# Patient Record
Sex: FEMALE | Race: WHITE | NOT HISPANIC OR LATINO | Employment: OTHER | URBAN - METROPOLITAN AREA
[De-identification: names, ages, dates, MRNs, and addresses within clinical notes are randomized per-mention and may not be internally consistent; named-entity substitution may affect disease eponyms.]

---

## 2017-02-14 ENCOUNTER — COMMUNICATION - HEALTHEAST (OUTPATIENT)
Dept: FAMILY MEDICINE | Facility: CLINIC | Age: 67
End: 2017-02-14

## 2017-02-14 DIAGNOSIS — I10 UNSPECIFIED ESSENTIAL HYPERTENSION: ICD-10-CM

## 2017-05-19 ENCOUNTER — RECORDS - HEALTHEAST (OUTPATIENT)
Dept: ADMINISTRATIVE | Facility: OTHER | Age: 67
End: 2017-05-19

## 2017-05-23 ENCOUNTER — RECORDS - HEALTHEAST (OUTPATIENT)
Dept: ADMINISTRATIVE | Facility: OTHER | Age: 67
End: 2017-05-23

## 2017-06-09 ENCOUNTER — AMBULATORY - HEALTHEAST (OUTPATIENT)
Dept: FAMILY MEDICINE | Facility: CLINIC | Age: 67
End: 2017-06-09

## 2017-06-09 ENCOUNTER — OFFICE VISIT - HEALTHEAST (OUTPATIENT)
Dept: FAMILY MEDICINE | Facility: CLINIC | Age: 67
End: 2017-06-09

## 2017-06-09 DIAGNOSIS — R73.01 IMPAIRED FASTING GLUCOSE: ICD-10-CM

## 2017-06-09 DIAGNOSIS — K21.9 GERD (GASTROESOPHAGEAL REFLUX DISEASE): ICD-10-CM

## 2017-06-09 DIAGNOSIS — I10 ESSENTIAL (PRIMARY) HYPERTENSION: ICD-10-CM

## 2017-06-09 DIAGNOSIS — I10 ESSENTIAL HYPERTENSION: ICD-10-CM

## 2017-06-09 DIAGNOSIS — I10 UNSPECIFIED ESSENTIAL HYPERTENSION: ICD-10-CM

## 2017-06-09 DIAGNOSIS — E78.5 HYPERLIPIDEMIA, UNSPECIFIED HYPERLIPIDEMIA TYPE: ICD-10-CM

## 2017-06-09 DIAGNOSIS — Z00.00 ROUTINE GENERAL MEDICAL EXAMINATION AT A HEALTH CARE FACILITY: ICD-10-CM

## 2017-06-09 LAB
CHOLEST SERPL-MCNC: 172 MG/DL
FASTING STATUS PATIENT QL REPORTED: YES
HBA1C MFR BLD: 6 % (ref 3.5–6)
HDLC SERPL-MCNC: 41 MG/DL
LDLC SERPL CALC-MCNC: 93 MG/DL
TRIGL SERPL-MCNC: 190 MG/DL

## 2017-06-09 ASSESSMENT — MIFFLIN-ST. JEOR: SCORE: 1312.77

## 2017-06-12 ENCOUNTER — COMMUNICATION - HEALTHEAST (OUTPATIENT)
Dept: FAMILY MEDICINE | Facility: CLINIC | Age: 67
End: 2017-06-12

## 2017-06-12 LAB — HCV AB SERPL QL IA: NEGATIVE

## 2017-08-30 ENCOUNTER — PRE VISIT (OUTPATIENT)
Dept: OTOLARYNGOLOGY | Facility: CLINIC | Age: 67
End: 2017-08-30

## 2017-08-30 NOTE — TELEPHONE ENCOUNTER
1.  Date/reason for appt: 9/22/17 -- tinnitus    2.  Referring provider: self    3.  Call to patient (Yes / No - short description): no, former pt of Dr. Lovett, last seen 7/6/10 -- records in [facility].

## 2017-09-11 ENCOUNTER — COMMUNICATION - HEALTHEAST (OUTPATIENT)
Dept: FAMILY MEDICINE | Facility: CLINIC | Age: 67
End: 2017-09-11

## 2017-09-14 ENCOUNTER — HOSPITAL ENCOUNTER (OUTPATIENT)
Dept: MAMMOGRAPHY | Facility: HOSPITAL | Age: 67
Discharge: HOME | End: 2017-09-14
Attending: FAMILY MEDICINE

## 2017-09-14 DIAGNOSIS — Z12.31 VISIT FOR SCREENING MAMMOGRAM: ICD-10-CM

## 2017-09-28 DIAGNOSIS — H93.19 TINNITUS: Primary | ICD-10-CM

## 2017-10-03 ENCOUNTER — RECORDS - HEALTHEAST (OUTPATIENT)
Dept: ADMINISTRATIVE | Facility: OTHER | Age: 67
End: 2017-10-03

## 2017-10-06 ENCOUNTER — OFFICE VISIT (OUTPATIENT)
Dept: OTOLARYNGOLOGY | Facility: CLINIC | Age: 67
End: 2017-10-06

## 2017-10-06 ENCOUNTER — OFFICE VISIT (OUTPATIENT)
Dept: AUDIOLOGY | Facility: CLINIC | Age: 67
End: 2017-10-06

## 2017-10-06 VITALS — BODY MASS INDEX: 32.07 KG/M2 | HEIGHT: 63 IN | WEIGHT: 181 LBS

## 2017-10-06 DIAGNOSIS — H93.19 TINNITUS, UNSPECIFIED LATERALITY: Primary | ICD-10-CM

## 2017-10-06 DIAGNOSIS — H90.3 SENSORY HEARING LOSS, BILATERAL: Primary | ICD-10-CM

## 2017-10-06 RX ORDER — FLUOCINONIDE TOPICAL SOLUTION USP, 0.05% 0.5 MG/ML
SOLUTION TOPICAL
COMMUNITY
Start: 2017-05-23 | End: 2023-06-19

## 2017-10-06 RX ORDER — LISINOPRIL/HYDROCHLOROTHIAZIDE 10-12.5 MG
TABLET ORAL
COMMUNITY
Start: 2017-06-09 | End: 2022-06-21 | Stop reason: DRUGHIGH

## 2017-10-06 RX ORDER — ASPIRIN 81 MG/1
81 TABLET, CHEWABLE ORAL DAILY
COMMUNITY

## 2017-10-06 RX ORDER — ATORVASTATIN CALCIUM 40 MG/1
40 TABLET, FILM COATED ORAL AT BEDTIME
COMMUNITY

## 2017-10-06 ASSESSMENT — PAIN SCALES - GENERAL: PAINLEVEL: NO PAIN (0)

## 2017-10-06 NOTE — PATIENT INSTRUCTIONS
You will need  to schedule a follow up appointment in one year with an audio.   Please call our clinic for any questions,concerns,or worsening symptoms.      Clinic #[phone]       Option 3  for the triage nurse.

## 2017-10-06 NOTE — LETTER
10/6/2017       RE: Elzbieta Mendez  [address]  [facility] [address]     Dear Colleague,    Thank you for referring your patient, Elzbieta Mendez, to the [place] [facility] at [facility]. Please see a copy of my visit note below.    HPI: Elzbieta Mendez is a relatively healthy 66yoF with a history of controlled HTN being seen with a complaint of recent R ear fullness and increased pulsatile tinnitus. She was first symptomatic for pulsatile tinnitus of the R ear in 2010 when she received a CT which was normal. There was no associated ear pain, fullness, or vertigo at the time. For the past couple of months, she reports an increase in intensity of her pulsatile tinnitus with fullness and no other symptoms. Following removal of cerumen from her R ear by the audiologist, she stated her symptoms of increased tinnitus and fullness immediately resolved back to her baseline.      PMH:   HTN     FAMHx:  Her family does not have a history of otosclerosis.  The unilateral pulsatile tinnitus was seen.        MEDS: Lipitor, lisinopril.       ROS:  Her 12 point review of systems is negative except for what is stated in HPI.       PE: TMs anatomically intact and normal without evidence of retraction or effusion. Charles is midline. Neck is supple and no masses palpated.    AUDIOGRAM/TYMPANOMETRY: Borderline normal to mild SNHL bilaterally up to 4000 Hz sloping downward to moderate bilaterally up to 8000 Hz. Word rec 92 and 96 percent for L and R ear respectively. Normal tymps.     IMPRESSION:  Pulsatile tinnitus of the R ear with an unidentified etiology. Recent increased intensity and fullness likely a result of cerumen impaction as symptoms resolved following removal. Possible etiologies of pulsatile tinnitus include a persistent congenital stapedial artery or other microvascular compression of nerves or occicular chain.  Varied course of ICA, an exposed jugular bulb,  AVM/fistula or a vascularized tumor such as a paraganglioma are less likely given the normal CT in 2010.  Discussed options at this point and the patient did not wish to do anything further.     I, Jake Lovett MD, saw this patient with the student and agree with the findings and plan of care as documented in the resident s note. I personally reviewed the medications, labs and imaging.      Again, thank you for allowing me to participate in the care of your patient.      Sincerely,    Jake Lovett MD

## 2017-10-06 NOTE — MR AVS SNAPSHOT
After Visit Summary   10/6/2017    Elzbieta Mendez    MRN: [id number]           Patient Information     Date Of Birth          1950        Visit Information        Provider Department      10/6/2017 9:30 AM Jake Lovett MD [facility]        Today's Diagnoses     Tinnitus, unspecified laterality    -  1      Care Instructions    You will need  to schedule a follow up appointment in one year with an audio.   Please call our clinic for any questions,concerns,or worsening symptoms.      Clinic #[phone]       Option 3  for the triage nurse.          Follow-ups after your visit        Who to contact     Please call your clinic at [phone] to:    Ask questions about your health    Make or cancel appointments    Discuss your medicines    Learn about your test results    Speak to your doctor   If you have compliments or concerns about an experience at your clinic, or if you wish to file a complaint, please contact [facility] Patient Relations at [phone] or email us at [email or web address]         Additional Information About Your Visit        MyChart Information     Monthlys is an electronic gateway that provides easy, online access to your medical records. With Monthlys, you can request a clinic appointment, read your test results, renew a prescription or communicate with your care team.     To sign up for Monthlys visit the website at [email or web address]   You will be asked to enter the access code listed below, as well as some personal information. Please follow the directions to create your username and password.     Your access code is: [email or web address]  Expires: 2017  6:30 AM     Your access code will  in 90 days. If you need help or a new code, please contact your [facility] or call [phone] for assistance.        Care EveryWhere ID     This is your Care EveryWhere ID. This  "could be used by other organizations to access your [place] medical records  [id number]        Your Vitals Were     Height BMI (Body Mass Index)                1.59 m (5' 2.6\") 32.48 kg/m2           Blood Pressure from Last 3 Encounters:   No data found for BP    Weight from Last 3 Encounters:   10/06/17 82.1 kg (181 lb)              Today, you had the following     No orders found for display       Primary Care Provider    None Specified       No primary provider on file.        Equal Access to Services     [facility] HospitalDOMING[facility] : Hadii aad ku hadasho Soomaali, waaxda luqadaha, qaybta kaalmada adeegyada, waxana cameliain barbaran abdirahman winnieshantell laselam . So [facility] [phone].    ATENCIÓN: Si habla español, tiene a diaz disposición servicios gratuitos de asistencia lingüística. [facility]Ohio[facility] [phone].    We comply with applicable federal civil rights laws and [place] laws. We do not discriminate on the basis of race, color, national origin, age, disability, sex, sexual orientation, or gender identity.            Thank you!     Thank you for choosing [facility] EAR NOSE AND THROAT  for your care. Our goal is always to provide you with excellent care. Hearing back from our patients is one way we can continue to improve our services. Please take a few minutes to complete the written survey that you may receive in the mail after your visit with us. Thank you!             Your Updated Medication List - Protect others around you: Learn how to safely use, store and throw away your medicines at www.disposemymeds.org.          This list is accurate as of: 10/6/17 11:59 PM.  Always use your most recent med list.                   Brand Name Dispense Instructions for use Diagnosis    aspirin 81 MG chewable tablet      81 mg        atorvastatin 40 MG tablet    LIPITOR     Take 40 mg by mouth        fluocinonide 0.05 % solution    LIDEX     KWADWO EXT TO THE SCALP 3 NIGHTS PER WEEK HS        lisinopril-hydrochlorothiazide 10-12.5 MG per tablet    " PRINZIDE/ZESTORETIC     TAKE 1 TABLET BY MOUTH EVERY DAY        omeprazole 20 MG CR capsule    priLOSEC     TAKE ONE CAPSULE BY MOUTH EVERY DAY

## 2017-10-06 NOTE — MR AVS SNAPSHOT
After Visit Summary   10/6/2017    Elzbieta Mendez    MRN: [id number]           Patient Information     Date Of Birth          1950        Visit Information        Provider Department      10/6/2017 9:00 AM Osiris Cheney AuD M [facility] Audiology        Today's Diagnoses     Sensory hearing loss, bilateral    -  1       Follow-ups after your visit        Who to contact     Please call your clinic at [phone] to:    Ask questions about your health    Make or cancel appointments    Discuss your medicines    Learn about your test results    Speak to your doctor   If you have compliments or concerns about an experience at your clinic, or if you wish to file a complaint, please contact [facility] Patient Relations at [phone] or email us at [email or web address]         Additional Information About Your Visit        MyChart Information     Amadix is an electronic gateway that provides easy, online access to your medical records. With Amadix, you can request a clinic appointment, read your test results, renew a prescription or communicate with your care team.     To sign up for Emidat visit the website at [email or web address]   You will be asked to enter the access code listed below, as well as some personal information. Please follow the directions to create your username and password.     Your access code is: [email or web address]  Expires: 2017  6:30 AM     Your access code will  in 90 days. If you need help or a new code, please contact your [facility] or call [phone] for assistance.        Care EveryWhere ID     This is your Care EveryWhere ID. This could be used by other organizations to access your [place] medical records  [id number]         Blood Pressure from Last 3 Encounters:   No data found for BP    Weight from Last 3 Encounters:   10/06/17 82.1 kg (181 lb)              We Performed the  Following     AUDIOGRAM/TYMPANOGRAM - INTERFACE     AUDIOLOGY ADULT REFERRAL     Cerumen Removal (separate procedure)   (26285)     Cmpr[facility] Audiometry Thrshld Eval & Speech Recog (94003)     Tymps / Reflex   (63590)        Primary Care Provider    None Specified       No primary provider on file.        Equal Access to Services     ANT MOLINA : Hadii zack ku hadasho Soomaali, waaxda luqadaha, qaybta kaalmada adeegyada, waxay rajesh hayjunitoortiz simmonssavanahshantell long. So [facility] [phone].    ATENCIÓN: Si habla español, tiene a diaz disposición servicios gratuitos de asistencia lingüística. Llame al [phone].    We comply with applicable federal civil rights laws and [place] laws. We do not discriminate on the basis of race, color, national origin, age, disability, sex, sexual orientation, or gender identity.            Thank you!     Thank you for choosing [facility] AUDIOLOGY  for your care. Our goal is always to provide you with excellent care. Hearing back from our patients is one way we can continue to improve our services. Please take a few minutes to complete the written survey that you may receive in the mail after your visit with us. Thank you!             Your Updated Medication List - Protect others around you: Learn how to safely use, store and throw away your medicines at www.disposemymeds.org.          This list is accurate as of: 10/6/17 10:13 AM.  Always use your most recent med list.                   Brand Name Dispense Instructions for use Diagnosis    aspirin 81 MG chewable tablet      81 mg        atorvastatin 40 MG tablet    LIPITOR     Take 40 mg by mouth        fluocinonide 0.05 % solution    LIDEX     KWADWO EXT TO THE SCALP 3 NIGHTS PER WEEK HS        lisinopril-hydrochlorothiazide 10-12.5 MG per tablet    PRINZIDE/ZESTORETIC     TAKE 1 TABLET BY MOUTH EVERY DAY        omeprazole 20 MG CR capsule    priLOSEC     TAKE ONE CAPSULE BY MOUTH EVERY DAY

## 2017-10-06 NOTE — LETTER
Date:October 10, 2017      Patient was self referred, no letter generated. Do not send.        [facility] Health Information

## 2017-10-06 NOTE — LETTER
10/6/2017      RE: Elzbieta Mendez  [address]  [place] [address]       HPI: Elzbieta Mendez is a relatively healthy 66yoF with a history of controlled HTN being seen with a complaint of recent R ear fullness and increased pulsatile tinnitus. She was first symptomatic for pulsatile tinnitus of the R ear in 2010 when she received a CT which was normal. There was no associated ear pain, fullness, or vertigo at the time. For the past couple of months, she reports an increase in intensity of her pulsatile tinnitus with fullness and no other symptoms. Following removal of cerumen from her R ear by the audiologist, she stated her symptoms of increased tinnitus and fullness immediately resolved back to her baseline.      PMH:   HTN     FAMHx:  Her family does not have a history of otosclerosis.  The unilateral pulsatile tinnitus was seen.        MEDS: Lipitor, lisinopril.       ROS:  Her 12 point review of systems is negative except for what is stated in HPI.       PE: TMs anatomically intact and normal without evidence of retraction or effusion. Charles is midline. Neck is supple and no masses palpated.    AUDIOGRAM/TYMPANOMETRY: Borderline normal to mild SNHL bilaterally up to 4000 Hz sloping downward to moderate bilaterally up to 8000 Hz. Word rec 92 and 96 percent for L and R ear respectively. Normal tymps.     IMPRESSION:  Pulsatile tinnitus of the R ear with an unidentified etiology. Recent increased intensity and fullness likely a result of cerumen impaction as symptoms resolved following removal. Possible etiologies of pulsatile tinnitus include a persistent congenital stapedial artery or other microvascular compression of nerves or occicular chain.  Varied course of ICA, an exposed jugular bulb, AVM/fistula or a vascularized tumor such as a paraganglioma are less likely given the normal CT in 2010.  Discussed options at this point and the patient did not wish to do anything further.     Jake DE JESUS  MD Rachell, saw this patient with the student and agree with the findings and plan of care as documented in the resident s note. I personally reviewed the medications, labs and imaging.      Jake Lovett MD

## 2017-10-06 NOTE — PROGRESS NOTES
HPI: Elzbieta Mendez is a relatively healthy 66yoF with a history of controlled HTN being seen with a complaint of recent R ear fullness and increased pulsatile tinnitus. She was first symptomatic for pulsatile tinnitus of the R ear in 2010 when she received a CT which was normal. There was no associated ear pain, fullness, or vertigo at the time. For the past couple of months, she reports an increase in intensity of her pulsatile tinnitus with fullness and no other symptoms. Following removal of cerumen from her R ear by the audiologist, she stated her symptoms of increased tinnitus and fullness immediately resolved back to her baseline.      PMH:  HTN     FAMHx:  Her family does not have a history of otosclerosis.  The unilateral pulsatile tinnitus was seen.        MEDS: Lipitor, lisinopril.       ROS:  Her 12 point review of systems is negative except for what is stated in HPI.       PE: TMs anatomically intact and normal without evidence of retraction or effusion. Charles is midline. Neck is supple and no masses palpated.    AUDIOGRAM/TYMPANOMETRY: Borderline normal to mild SNHL bilaterally up to 4000 Hz sloping downward to moderate bilaterally up to 8000 Hz. Word rec 92 and 96 percent for L and R ear respectively. Normal tymps.     IMPRESSION:  Pulsatile tinnitus of the R ear with an unidentified etiology. Recent increased intensity and fullness likely a result of cerumen impaction as symptoms resolved following removal. Possible etiologies of pulsatile tinnitus include a persistent congenital stapedial artery or other microvascular compression of nerves or occicular chain.  Varied course of ICA, an exposed jugular bulb, AVM/fistula or a vascularized tumor such as a paraganglioma are less likely given the normal CT in 2010.  Discussed options at this point and the patient did not wish to do anything further.     IJake MD, saw this patient with the student and agree with the findings and plan of  care as documented in the resident s note. I personally reviewed the medications, labs and imaging.

## 2017-10-06 NOTE — PROGRESS NOTES
AUDIOLOGY REPORT    SUMMARY: Audiology visit completed. See audiogram for results.      RECOMMENDATIONS: Follow-up with ENT.    Gabriel Bullock.  Licensed Audiologist  [place] #[id number]

## 2017-11-26 ENCOUNTER — HEALTH MAINTENANCE LETTER (OUTPATIENT)
Age: 67
End: 2017-11-26

## 2018-08-31 ENCOUNTER — HOSPITAL ENCOUNTER (OUTPATIENT)
Dept: MAMMOGRAPHY | Facility: CLINIC | Age: 68
Discharge: HOME | End: 2018-08-31
Attending: FAMILY MEDICINE

## 2018-08-31 DIAGNOSIS — Z12.31 VISIT FOR SCREENING MAMMOGRAM: ICD-10-CM

## 2019-06-17 ENCOUNTER — COMMUNICATION - HEALTHEAST (OUTPATIENT)
Dept: FAMILY MEDICINE | Facility: CLINIC | Age: 69
End: 2019-06-17

## 2019-06-17 ENCOUNTER — OFFICE VISIT - HEALTHEAST (OUTPATIENT)
Dept: FAMILY MEDICINE | Facility: CLINIC | Age: 69
End: 2019-06-17

## 2019-06-17 ENCOUNTER — AMBULATORY - HEALTHEAST (OUTPATIENT)
Dept: FAMILY MEDICINE | Facility: CLINIC | Age: 69
End: 2019-06-17

## 2019-06-17 DIAGNOSIS — I10 ESSENTIAL HYPERTENSION: ICD-10-CM

## 2019-06-17 DIAGNOSIS — K21.9 GERD (GASTROESOPHAGEAL REFLUX DISEASE): ICD-10-CM

## 2019-06-17 DIAGNOSIS — E78.5 HYPERLIPIDEMIA LDL GOAL <130: ICD-10-CM

## 2019-06-17 DIAGNOSIS — R73.03 PREDIABETES: ICD-10-CM

## 2019-06-17 LAB
ALBUMIN SERPL-MCNC: 4.7 G/DL (ref 3.5–5)
ALP SERPL-CCNC: 50 U/L (ref 45–120)
ALT SERPL W P-5'-P-CCNC: 180 U/L (ref 0–45)
ANION GAP SERPL CALCULATED.3IONS-SCNC: 10 MMOL/L (ref 5–18)
AST SERPL W P-5'-P-CCNC: 89 U/L (ref 0–40)
BILIRUB SERPL-MCNC: 0.7 MG/DL (ref 0–1)
BUN SERPL-MCNC: 17 MG/DL (ref 8–22)
CALCIUM SERPL-MCNC: 10.7 MG/DL (ref 8.5–10.5)
CHLORIDE BLD-SCNC: 102 MMOL/L (ref 98–107)
CHOLEST SERPL-MCNC: 163 MG/DL
CO2 SERPL-SCNC: 27 MMOL/L (ref 22–31)
CREAT SERPL-MCNC: 0.87 MG/DL (ref 0.6–1.1)
FASTING STATUS PATIENT QL REPORTED: YES
GFR SERPL CREATININE-BSD FRML MDRD: >60 ML/MIN/1.73M2
GLUCOSE BLD-MCNC: 104 MG/DL (ref 70–125)
HBA1C MFR BLD: 6.2 % (ref 3.5–6)
HDLC SERPL-MCNC: 44 MG/DL
LDLC SERPL CALC-MCNC: 91 MG/DL
POTASSIUM BLD-SCNC: 4.3 MMOL/L (ref 3.5–5)
PROT SERPL-MCNC: 7.4 G/DL (ref 6–8)
SODIUM SERPL-SCNC: 139 MMOL/L (ref 136–145)
TRIGL SERPL-MCNC: 140 MG/DL

## 2019-07-12 ENCOUNTER — COMMUNICATION - HEALTHEAST (OUTPATIENT)
Dept: FAMILY MEDICINE | Facility: CLINIC | Age: 69
End: 2019-07-12

## 2019-07-12 DIAGNOSIS — R74.8 ELEVATED LIVER ENZYMES: ICD-10-CM

## 2019-07-16 ENCOUNTER — COMMUNICATION - HEALTHEAST (OUTPATIENT)
Dept: LAB | Facility: CLINIC | Age: 69
End: 2019-07-16

## 2019-07-16 ENCOUNTER — AMBULATORY - HEALTHEAST (OUTPATIENT)
Dept: FAMILY MEDICINE | Facility: CLINIC | Age: 69
End: 2019-07-16

## 2019-07-16 DIAGNOSIS — R79.89 ELEVATED LFTS: ICD-10-CM

## 2019-07-22 ENCOUNTER — AMBULATORY - HEALTHEAST (OUTPATIENT)
Dept: LAB | Facility: CLINIC | Age: 69
End: 2019-07-22

## 2019-07-22 DIAGNOSIS — R79.89 ELEVATED LFTS: ICD-10-CM

## 2019-07-22 LAB
ALBUMIN SERPL-MCNC: 4.7 G/DL (ref 3.5–5)
ALP SERPL-CCNC: 79 U/L (ref 45–120)
ALT SERPL W P-5'-P-CCNC: 98 U/L (ref 0–45)
ANION GAP SERPL CALCULATED.3IONS-SCNC: 13 MMOL/L (ref 5–18)
AST SERPL W P-5'-P-CCNC: 52 U/L (ref 0–40)
BILIRUB SERPL-MCNC: 0.7 MG/DL (ref 0–1)
BUN SERPL-MCNC: 8 MG/DL (ref 8–22)
CALCIUM SERPL-MCNC: 10.5 MG/DL (ref 8.5–10.5)
CHLORIDE BLD-SCNC: 102 MMOL/L (ref 98–107)
CO2 SERPL-SCNC: 24 MMOL/L (ref 22–31)
CREAT SERPL-MCNC: 0.8 MG/DL (ref 0.6–1.1)
GFR SERPL CREATININE-BSD FRML MDRD: >60 ML/MIN/1.73M2
GLUCOSE BLD-MCNC: 93 MG/DL (ref 70–125)
POTASSIUM BLD-SCNC: 4 MMOL/L (ref 3.5–5)
PROT SERPL-MCNC: 7.4 G/DL (ref 6–8)
SODIUM SERPL-SCNC: 139 MMOL/L (ref 136–145)

## 2019-07-23 ENCOUNTER — OFFICE VISIT - HEALTHEAST (OUTPATIENT)
Dept: FAMILY MEDICINE | Facility: CLINIC | Age: 69
End: 2019-07-23

## 2019-07-23 DIAGNOSIS — R79.89 ELEVATED LFTS: ICD-10-CM

## 2019-07-23 DIAGNOSIS — K75.81 STEATOHEPATITIS: ICD-10-CM

## 2019-07-23 DIAGNOSIS — R74.8 ELEVATED LIVER ENZYMES: ICD-10-CM

## 2019-07-23 DIAGNOSIS — E78.5 HYPERLIPIDEMIA LDL GOAL <130: ICD-10-CM

## 2019-07-29 ENCOUNTER — COMMUNICATION - HEALTHEAST (OUTPATIENT)
Dept: FAMILY MEDICINE | Facility: CLINIC | Age: 69
End: 2019-07-29

## 2019-08-22 ENCOUNTER — COMMUNICATION - HEALTHEAST (OUTPATIENT)
Dept: FAMILY MEDICINE | Facility: CLINIC | Age: 69
End: 2019-08-22

## 2019-09-03 ENCOUNTER — AMBULATORY - HEALTHEAST (OUTPATIENT)
Dept: LAB | Facility: CLINIC | Age: 69
End: 2019-09-03

## 2019-09-03 ENCOUNTER — HOSPITAL ENCOUNTER (OUTPATIENT)
Dept: MAMMOGRAPHY | Facility: CLINIC | Age: 69
Discharge: HOME | End: 2019-09-03
Attending: FAMILY MEDICINE

## 2019-09-03 DIAGNOSIS — Z12.31 SCREENING MAMMOGRAM, ENCOUNTER FOR: ICD-10-CM

## 2019-09-03 DIAGNOSIS — E78.5 HYPERLIPIDEMIA LDL GOAL <130: ICD-10-CM

## 2019-09-03 DIAGNOSIS — K75.81 STEATOHEPATITIS: ICD-10-CM

## 2019-09-03 DIAGNOSIS — R79.89 ELEVATED LFTS: ICD-10-CM

## 2019-09-03 LAB
ALBUMIN SERPL-MCNC: 4.3 G/DL (ref 3.5–5)
ALP SERPL-CCNC: 78 U/L (ref 45–120)
ALT SERPL W P-5'-P-CCNC: 53 U/L (ref 0–45)
ANION GAP SERPL CALCULATED.3IONS-SCNC: 13 MMOL/L (ref 5–18)
AST SERPL W P-5'-P-CCNC: 40 U/L (ref 0–40)
BILIRUB SERPL-MCNC: 0.9 MG/DL (ref 0–1)
BUN SERPL-MCNC: 13 MG/DL (ref 8–22)
CALCIUM SERPL-MCNC: 10.1 MG/DL (ref 8.5–10.5)
CHLORIDE BLD-SCNC: 101 MMOL/L (ref 98–107)
CHOLEST SERPL-MCNC: 171 MG/DL
CO2 SERPL-SCNC: 24 MMOL/L (ref 22–31)
CREAT SERPL-MCNC: 0.84 MG/DL (ref 0.6–1.1)
FASTING STATUS PATIENT QL REPORTED: YES
GFR SERPL CREATININE-BSD FRML MDRD: >60 ML/MIN/1.73M2
GLUCOSE BLD-MCNC: 110 MG/DL (ref 70–125)
HDLC SERPL-MCNC: 41 MG/DL
LDLC SERPL CALC-MCNC: 84 MG/DL
POTASSIUM BLD-SCNC: 4 MMOL/L (ref 3.5–5)
PROT SERPL-MCNC: 7.1 G/DL (ref 6–8)
SODIUM SERPL-SCNC: 138 MMOL/L (ref 136–145)
TRIGL SERPL-MCNC: 232 MG/DL

## 2019-09-04 ENCOUNTER — COMMUNICATION - HEALTHEAST (OUTPATIENT)
Dept: FAMILY MEDICINE | Facility: CLINIC | Age: 69
End: 2019-09-04

## 2020-03-01 ENCOUNTER — HEALTH MAINTENANCE LETTER (OUTPATIENT)
Age: 70
End: 2020-03-01

## 2020-03-30 ENCOUNTER — COMMUNICATION - HEALTHEAST (OUTPATIENT)
Dept: FAMILY MEDICINE | Facility: CLINIC | Age: 70
End: 2020-03-30

## 2020-09-08 ENCOUNTER — HOSPITAL ENCOUNTER (OUTPATIENT)
Dept: MAMMOGRAPHY | Facility: CLINIC | Age: 70
Discharge: HOME | End: 2020-09-08
Attending: FAMILY MEDICINE

## 2020-09-08 DIAGNOSIS — Z12.31 VISIT FOR SCREENING MAMMOGRAM: ICD-10-CM

## 2020-12-14 ENCOUNTER — HEALTH MAINTENANCE LETTER (OUTPATIENT)
Age: 70
End: 2020-12-14

## 2021-04-17 ENCOUNTER — HEALTH MAINTENANCE LETTER (OUTPATIENT)
Age: 71
End: 2021-04-17

## 2021-05-14 ENCOUNTER — RECORDS - HEALTHEAST (OUTPATIENT)
Dept: FAMILY MEDICINE | Facility: CLINIC | Age: 71
End: 2021-05-14

## 2021-05-14 DIAGNOSIS — Z12.31 VISIT FOR SCREENING MAMMOGRAM: ICD-10-CM

## 2021-05-27 ENCOUNTER — RECORDS - HEALTHEAST (OUTPATIENT)
Dept: ADMINISTRATIVE | Facility: CLINIC | Age: 71
End: 2021-05-27

## 2021-05-28 ENCOUNTER — RECORDS - HEALTHEAST (OUTPATIENT)
Dept: ADMINISTRATIVE | Facility: CLINIC | Age: 71
End: 2021-05-28

## 2021-05-29 ENCOUNTER — RECORDS - HEALTHEAST (OUTPATIENT)
Dept: ADMINISTRATIVE | Facility: CLINIC | Age: 71
End: 2021-05-29

## 2021-05-29 NOTE — PROGRESS NOTES
Patient ID: Elzbieta Mendez is a 68 y.o. female.  /82 Comment: machine  Pulse 74   Wt 185 lb (83.9 kg)   SpO2 98%   BMI 33.57 kg/m      Assessment/Plan:                   Diagnoses and all orders for this visit:    Hyperlipidemia LDL goal <130  -     Lipid Cascade    Essential hypertension  -     Comprehensive Metabolic Panel    GERD (gastroesophageal reflux disease)    Prediabetes  -     Comprehensive Metabolic Panel  -     Glycosylated Hemoglobin A1c    Other orders  -     Cancel: Ambulatory referral for Colonoscopy  -     Cancel: atorvastatin (LIPITOR) 40 MG tablet; Take 1 tablet (40 mg total) by mouth daily.  Dispense: 90 tablet; Refill: 3  -     Cancel: lisinopril-hydrochlorothiazide (PRINZIDE,ZESTORETIC) 10-12.5 mg per tablet; TAKE 1 TABLET BY MOUTH EVERY DAY  Dispense: 90 tablet; Refill: 3  -     Cancel: omeprazole (PRILOSEC) 20 MG capsule; TAKE ONE CAPSULE BY MOUTH EVERY DAY  Dispense: 90 capsule; Refill: 3        DISCUSSION  I recommend at this time stopping fenofibrate as I do not feel it confers additional cardiac risk reduction given her cholesterol numbers.  Recommend she continue with statin, recommend she continue aspirin for primary prevention as well as blood pressure lowering medication.  We will recheck a all labs as noted above.  I will let her know if there are any additional changes.  Subjective:     HPI    Elzbieta Mendez is a 68 y.o. female who is here today to discuss ongoing management of hyperlipidemia, hypertension and prediabetes.  Patient has moved to [place] for the most part.  She does still receive some health care [place] [facility] [place] in the summer months.  Patient came in today for review primarily of cholesterol management.  When she moved to [place] she was placed on fenofibrate in addition to her atorvastatin 40.  Patient states she had elevated triglycerides and this is the reason for this.  Today we discussed cardiac disease risk reduction.   She has had triglycerides reported to be in the range of 100-200.  Oftentimes triglycerides are elevated.  I discussed with her current evidence regarding use of fibroids for management of triglycerides for cardiovascular disease risk duction.  I feel that in her case based on her triglyceride levels that she probably is not receiving significant benefit in terms of further risk reduction being this likely puts her at higher risk for having side effects associated with the statin and fenofibrate combination.  Today we discussed checking lab test.  She continues to feel well overall.  She had moved to [place] to help with care for her grandson who has some significant health issues.    Review of Systems          Objective:   Medications:  Current Outpatient Medications   Medication Sig Note     fenofibrate (TRIGLIDE) 160 MG tablet Take 160 mg by mouth daily.      aspirin 81 MG EC tablet Take 81 mg by mouth daily.      atorvastatin (LIPITOR) 40 MG tablet Take 1 tablet (40 mg total) by mouth daily.      clindamycin (CLEOCIN T) 1 % external solution KWADWO EXT AA BID PRN 6/9/2017: Received from: External Pharmacy     fluocinonide (LIDEX) 0.05 % external solution KWADWO EXT TO THE SCALP 3 NIGHTS PER WEEK HS 6/9/2017: Received from: External Pharmacy     lisinopril-hydrochlorothiazide (PRINZIDE,ZESTORETIC) 10-12.5 mg per tablet TAKE 1 TABLET BY MOUTH EVERY DAY      omeprazole (PRILOSEC) 20 MG capsule TAKE ONE CAPSULE BY MOUTH EVERY DAY      ranitidine (ZANTAC) 150 MG tablet Take 150 mg by mouth.        Allergies:  Allergies   Allergen Reactions     Tetanus Vaccines And Toxoid Swelling       Tobacco:   reports that she has never smoked. She has never used smokeless tobacco.     Physical Exam          /82 Comment: machine  Pulse 74   Wt 185 lb (83.9 kg)   SpO2 98%   BMI 33.57 kg/m        General Appearance:    Alert, cooperative, no distress   Eyes:   No scleral icterus or conjunctival irritation       Lungs:     Clear  to auscultation bilaterally, respirations unlabored, no wheezes or crackles   Heart:    Regular rate and rhythm,  No murmur   Extremities:  No edema, no joint swelling or redness, no evidence of any injuries   Skin:  No concerning skin findings, no suspicious moles, no rashes   Neurologic:  On gross examination there is no motor or sensory deficit.  Patient walks with a normal gait

## 2021-05-30 ENCOUNTER — RECORDS - HEALTHEAST (OUTPATIENT)
Dept: ADMINISTRATIVE | Facility: CLINIC | Age: 71
End: 2021-05-30

## 2021-05-30 NOTE — TELEPHONE ENCOUNTER
Reason contacted:  Imaging question  Information relayed:  Below message   Additional questions:  No  Further follow-up needed:  No  Okay to leave a detailed message:  No

## 2021-05-30 NOTE — TELEPHONE ENCOUNTER
Who is calling: Patient  Reason for Call: Patient is questioning why an ultrasound of the abdomen was ordered. Patient was under the impression she ws going to hold on the imaging at this time. Please reach out to the patient and advise.  Date of last appointment with primary care: 07/23/2019  Okay to leave a detailed message: Yes

## 2021-05-30 NOTE — TELEPHONE ENCOUNTER
CMP pended for approval  Patient notified that she does not need     Per 6/17/2019 Boomrat message:   The AST and ALT are liver tests.  They are elevated compared to previous.  It is uncertain as to exactly why these numbers are up.  This could be from a very mild infection, it could be if you had any alcohol intake recently or it could be a side effect of medications, even the combination of the fenofibrate and atorvastatin as we have discussed.  At this point I do not think we need to be worried about this elevation, but it is something that we should recheck soon.  I think we give it 1 month and have you return for a lab visit.  If numbers remain elevated we may need to consider some additional testing.  If they return to your baseline level obviously there would be no real concern.  We will just need to find a way for you to be able to get some repeat lab tests in about 1 month.     Your A1c number for the blood sugar did creep up just a little bit.  Continue to work hard on the blood sugar.  It is not indicating diabetes.

## 2021-05-30 NOTE — TELEPHONE ENCOUNTER
Who is calling:  Elzbieta   Reason for Call:  Patient, stated Dr. John wanted her to have labs done week of 7/22/19. I did not see any orders for labs. Patient would also like to know if she needs to fast for her labs   Date of last appointment with primary care: 6/17/19  Okay to leave a detailed message: Yes, please call patient to advise her if she needs to fast

## 2021-05-30 NOTE — TELEPHONE ENCOUNTER
Please apologize, I had meant that this would be done down the line only if the abnormality persisted.  I put the order in incorrectly which prompted it to be an active order right away.  Please tell her to disregard for the time being and that we should proceed as we had discussed and obtain the ultrasound only if needed.

## 2021-05-30 NOTE — PROGRESS NOTES
Patient ID: Elzbieta Mendez is a 68 y.o. female.  /76   Pulse 81   SpO2 98%     Assessment/Plan:                   Diagnoses and all orders for this visit:    Elevated LFTs  -     Comprehensive Metabolic Panel; Future; Expected date: 09/02/2019    Steatohepatitis  -     Comprehensive Metabolic Panel; Future; Expected date: 09/02/2019          DISCUSSION  Continue current treatment.  Continue active healthy lifestyle with regular exercise and a healthy balanced diet.  Return for recheck of labs in September.  Consider additional evaluation at that time.  Subjective:     HPI    Elzbieta Mendez is a 68 y.o. female who is here today to discuss recent liver and cholesterol test results.  Patient has a prior history of statin hepatitis seen on imaging tests.  She had slight elevation in liver test recently.  Patient had in the interim since her last evaluation here been placed on fenofibrate by a provider in [place] where she now resides most of the time.  We discussed considerations regarding this combination of medical therapy and the risk of side effect.  Discussed the likely rationale for the combination of medications but patient had preferred to stop this combination and proceed only with the statin.  Upon doing so we noticed a slight improvement in her liver test results, whether this was truly from this medication or not is in question.  We discussed her diagnosis of steatohepatitis and the importance of ongoing monitoring.  Recheck labs and then decide on further course of action.    Review of Systems  Complete review of systems is obtained.  Other than the specific considerations noted above complete review of systems is negative.          Objective:   Medications:  Current Outpatient Medications   Medication Sig Note     aspirin 81 MG EC tablet Take 81 mg by mouth daily.      clindamycin (CLEOCIN T) 1 % external solution KWADWO EXT AA BID PRN 6/9/2017: Received from: External Pharmacy     fluocinonide  (LIDEX) 0.05 % external solution KWADWO EXT TO THE SCALP 3 NIGHTS PER WEEK  6/9/2017: Received from: External Pharmacy     lisinopril-hydrochlorothiazide (PRINZIDE,ZESTORETIC) 10-12.5 mg per tablet TAKE 1 TABLET BY MOUTH EVERY DAY      omeprazole (PRILOSEC) 20 MG capsule TAKE ONE CAPSULE BY MOUTH EVERY DAY      ranitidine (ZANTAC) 150 MG tablet Take 150 mg by mouth.      atorvastatin (LIPITOR) 40 MG tablet Take 1 tablet (40 mg total) by mouth daily.      fenofibrate (TRIGLIDE) 160 MG tablet Take 160 mg by mouth daily.        Allergies:  Allergies   Allergen Reactions     Tetanus Vaccines And Toxoid Swelling       Tobacco:   reports that she has never smoked. She has never used smokeless tobacco.     Physical Exam          /76   Pulse 81   SpO2 98%         General Appearance:    Alert, cooperative, no distress   Eyes:   No scleral icterus or conjunctival irritation       Extremities:  No edema, no joint swelling or redness, no evidence of any injuries   Skin:  No concerning skin findings, no suspicious moles, no rashes   Neurologic:  On gross examination there is no motor or sensory deficit.  Patient walks with a normal gait

## 2021-05-31 ENCOUNTER — RECORDS - HEALTHEAST (OUTPATIENT)
Dept: ADMINISTRATIVE | Facility: CLINIC | Age: 71
End: 2021-05-31

## 2021-05-31 VITALS — BODY MASS INDEX: 33.49 KG/M2 | HEIGHT: 62 IN | WEIGHT: 182 LBS

## 2021-06-02 ENCOUNTER — RECORDS - HEALTHEAST (OUTPATIENT)
Dept: ADMINISTRATIVE | Facility: CLINIC | Age: 71
End: 2021-06-02

## 2021-06-03 VITALS — BODY MASS INDEX: 33.19 KG/M2 | WEIGHT: 185 LBS

## 2021-06-11 NOTE — PROGRESS NOTES
" Patient ID: Elzbieta Mendez is a 66 y.o. female.  /76  Pulse 68  Resp 16  Ht 5' 2.25\" (1.581 m)  Wt 182 lb (82.6 kg)  BMI 33.02 kg/m2    Assessment/Plan:                Diagnoses and all orders for this visit:    Routine general medical examination at a health care facility  -     Hepatitis C Antibody (Anti-HCV)    Essential (primary) hypertension  -     Comprehensive Metabolic Panel    Hyperlipidemia, unspecified hyperlipidemia type  -     Comprehensive Metabolic Panel  -     Lipid Heath FASTING    Impaired fasting glucose  -     Glycosylated Hemoglobin A1c    Hypertension  -     lisinopril-hydrochlorothiazide (PRINZIDE,ZESTORETIC) 10-12.5 mg per tablet; TAKE 1 TABLET BY MOUTH EVERY DAY  Dispense: 90 tablet; Refill: 3    GERD (gastroesophageal reflux disease)  -     omeprazole (PRILOSEC) 20 MG capsule; TAKE ONE CAPSULE BY MOUTH EVERY DAY  Dispense: 90 capsule; Refill: 3    Essential hypertension    Other orders  -     atorvastatin (LIPITOR) 40 MG tablet; Take 1 tablet (40 mg total) by mouth daily.  Dispense: 90 tablet; Refill: 3      DISCUSSION  Obtain labs as above.  Continue current medication treatment.  Subjective:     HPI    Elzbieta Mendez is a 66-year-old female who is here today for a physical.  She has coronary atherosclerosis found with routine coronary calcium CT score testing.  She does not have any symptoms to suggest any concerns she is on appropriate medical management.  Reviewed other routine health screening including colonoscopy due in 2019.  Reviewed recent mammography report.  Discussed that she is not indicated to have cervical cancer screening because she has had a hysterectomy for benign reasons in the past.  Patient states she had seen an ENT provider, orthopedic specialist and a gynecologist in the past year.  She reports no current gynecologic issues.  She continues to see the hand specialist because of ongoing thumb pain.  She is also seen orthopedic specialist for knee " pain in the past.  Reports some increasing knee pain more recently.  She is seen by dermatology and has had several precancerous lesions treated.  She does have planned follow-up there.  She is also been seen by ENT when she had an acute infection in the larynx.  She no longer reports any symptoms in that regard.  She does have prediabetes with an A1c of 5.8 on last year.  Discussed laboratory reevaluation for all underlying concerns.  Reports no other symptomatic concerns.    Review of Systems  Complete review of systems is obtained.  Other than the specific considerations noted above complete review of systems is negative.      Objective:   Medications:  Current Outpatient Prescriptions   Medication Sig Note     aspirin 81 MG EC tablet Take 81 mg by mouth daily.      atorvastatin (LIPITOR) 40 MG tablet Take 1 tablet (40 mg total) by mouth daily.      clindamycin (CLEOCIN T) 1 % external solution KWADWO EXT AA BID PRN 6/9/2017: Received from: External Pharmacy     fluocinonide (LIDEX) 0.05 % external solution KWADWO EXT TO THE SCALP 3 NIGHTS PER WEEK HS 6/9/2017: Received from: External Pharmacy     lisinopril-hydrochlorothiazide (PRINZIDE,ZESTORETIC) 10-12.5 mg per tablet TAKE 1 TABLET BY MOUTH EVERY DAY      omeprazole (PRILOSEC) 20 MG capsule TAKE ONE CAPSULE BY MOUTH EVERY DAY      Allergies:  Allergies   Allergen Reactions     Tetanus Vaccines And Toxoid Swelling     Tobacco:   reports that she has never smoked. She does not have any smokeless tobacco history on file.    HEALTH PREVENTION    General  Dental care: Discussed the importance of regular dental care.  Eye care: Discussed importance of routine eye exams for glaucoma screening  Exercise: Discussed maintaining physical activity  Diet: Discussed healthy balanced diet    Wt Readings from Last 3 Encounters:   06/09/17 182 lb (82.6 kg)   09/12/16 184 lb (83.5 kg)   08/26/16 180 lb (81.6 kg)     Body mass index is 33.02 kg/(m^2).    The following high BMI  "interventions were performed this visit: encouragement to exercise and weight monitoring    Cancer screening  Skin cancer: Discussed sun burn prevention and self monitoring.  Colon cancer: Colon cancer screening is discussed.  Reviewed colonoscopy report  Breast Cancer: Reviewed mammography report  Cervical Cancer: Not indicated    Cholesterol:   LDL Calculated (mg/dL)   Date Value   05/27/2016 83   05/22/2015 97   02/17/2014 76      Blood Pressure:   BP Readings from Last 3 Encounters:   06/09/17 128/76   08/26/16 104/80   05/27/16 128/74       Immunization History   Administered Date(s) Administered     Influenza, inj, historic 11/02/2007, 10/31/2008     Influenza, seasonal,quad inj 36+ mos 10/14/2015     Influenza, seasonal,quad inj 6-35 mos 10/01/2012, 09/22/2014     Tdap 04/14/2014     ZOSTER 01/07/2013     There are no preventive care reminders to display for this patient.     Physical Exam      /76  Pulse 68  Resp 16  Ht 5' 2.25\" (1.581 m)  Wt 182 lb (82.6 kg)  BMI 33.02 kg/m2    General Appearance:    Alert, cooperative, no distress, appears stated age   Head:    Normocephalic, without obvious abnormality, atraumatic   Eyes:    PERRL, conjunctiva/corneas clear, EOM's intact       Ears:    Normal TM's and external ear canals, both ears   Nose:   Nares normal, septum midline, mucosa normal, no drainage    or sinus tenderness   Throat:   Lips, mucosa, and tongue normal; teeth and gums normal   Neck:   Supple, symmetrical, trachea midline, no adenopathy;        thyroid:  No enlargement/tenderness/nodules   Lungs:     Clear to auscultation bilaterally, respirations unlabored   Chest wall/ Breast:    No tenderness or deformity   Heart:    Regular rate and rhythm, S1 and S2 normal, no murmur, rub   or gallop   Abdomen:     Soft, non-tender, bowel sounds active all four quadrants,     no masses, no organomegaly   Extremities:   Extremities normal, atraumatic, no cyanosis or edema   Pulses:   2+ and " symmetric all extremities   Skin:   Skin color, texture, turgor normal, no rashes or lesions   Neurologic:   CNII-XII intact. Normal strength, sensation and reflexes       throughout

## 2021-06-29 ENCOUNTER — RECORDS - HEALTHEAST (OUTPATIENT)
Dept: ADMINISTRATIVE | Facility: OTHER | Age: 71
End: 2021-06-29

## 2021-07-13 ENCOUNTER — RECORDS - HEALTHEAST (OUTPATIENT)
Dept: ADMINISTRATIVE | Facility: CLINIC | Age: 71
End: 2021-07-13

## 2021-07-21 ENCOUNTER — RECORDS - HEALTHEAST (OUTPATIENT)
Dept: ADMINISTRATIVE | Facility: CLINIC | Age: 71
End: 2021-07-21

## 2021-07-22 ENCOUNTER — RECORDS - HEALTHEAST (OUTPATIENT)
Dept: FAMILY MEDICINE | Facility: CLINIC | Age: 71
End: 2021-07-22

## 2021-07-22 DIAGNOSIS — Z12.31 OTHER SCREENING MAMMOGRAM: ICD-10-CM

## 2021-08-23 RX ORDER — FENOFIBRATE 160 MG/1
160 TABLET ORAL
COMMUNITY
End: 2021-08-24 | Stop reason: SINTOL

## 2021-08-24 ENCOUNTER — OFFICE VISIT (OUTPATIENT)
Dept: FAMILY MEDICINE | Facility: CLINIC | Age: 71
End: 2021-08-24
Payer: COMMERCIAL

## 2021-08-24 VITALS
BODY MASS INDEX: 33.41 KG/M2 | WEIGHT: 186.2 LBS | DIASTOLIC BLOOD PRESSURE: 78 MMHG | HEART RATE: 88 BPM | OXYGEN SATURATION: 98 % | SYSTOLIC BLOOD PRESSURE: 136 MMHG

## 2021-08-24 DIAGNOSIS — M25.512 CHRONIC LEFT SHOULDER PAIN: ICD-10-CM

## 2021-08-24 DIAGNOSIS — M54.2 CERVICALGIA: ICD-10-CM

## 2021-08-24 DIAGNOSIS — G89.29 CHRONIC LEFT SHOULDER PAIN: ICD-10-CM

## 2021-08-24 DIAGNOSIS — I10 ESSENTIAL (PRIMARY) HYPERTENSION: Primary | ICD-10-CM

## 2021-08-24 DIAGNOSIS — E78.5 HYPERLIPIDEMIA, UNSPECIFIED HYPERLIPIDEMIA TYPE: ICD-10-CM

## 2021-08-24 DIAGNOSIS — I25.10 ATHEROSCLEROSIS OF NATIVE CORONARY ARTERY OF NATIVE HEART WITHOUT ANGINA PECTORIS: ICD-10-CM

## 2021-08-24 PROBLEM — K21.9 GERD WITHOUT ESOPHAGITIS: Status: ACTIVE | Noted: 2019-12-10

## 2021-08-24 PROBLEM — Z86.0100 HISTORY OF COLON POLYPS: Status: ACTIVE | Noted: 2019-12-10

## 2021-08-24 PROCEDURE — 99214 OFFICE O/P EST MOD 30 MIN: CPT | Performed by: FAMILY MEDICINE

## 2021-08-24 NOTE — PROGRESS NOTES
Elzbieta Mendez  /78   Pulse 88   Wt 84.5 kg (186 lb 3.2 oz)   SpO2 98%   BMI 33.41 kg/m       Assessment/Plan:                Elzbieta was seen today for recheck and hypertension.    Diagnoses and all orders for this visit:    Essential (primary) hypertension    Hyperlipidemia, unspecified hyperlipidemia type    Atherosclerosis of native coronary artery of native heart without angina pectoris    Cervicalgia    Chronic left shoulder pain         DISCUSSION  See discussion below. No indication for change in management. Recommend routine follow-up as prior.    No heart murmur noted on auscultation today. Suspect that this was a phenomenon associated with her acute illness. Would recommend routine visits with heart auscultation to determine if any further action should take place but would not recommend any additional testing right now.  Subjective:     HPI:    Elzbieta Mendez is a 70 year old female is here today to discuss several concerns. She has been seen here regularly for primary care in the past but has moved to [place] for winter months and also spends time in [facility] with family. She still comes here to have some of her medical care. She does have an established primary provider in [place]. She came here just to discuss a few concerns that have arisen both recently and over the course of the past year.    She was treated for pneumonia this past March. Her Covid test was negative. She has recovered. This was a significant ordeal for her.    She has chronic neck and shoulder pain on the left. She has in the past and is now concerned that this could be some type of heart concern. In 2013 for the same type of symptom we engaged in a work-up which led to a coronary angiogram that showed some atherosclerosis but no obstructive lesions. She has been on preventive medication since. She describes having shoulder and neck pain. From her description it is clearly musculoskeletal.  She reports she has worked with a therapist, her primary doctor as well as seeing a spine specialist/neurosurgeon in [place] who all thought that this was a musculoskeletal pain consideration. She and I discussed this extensively and I stated that I agree completely that the way she describes her pain and the clinical characteristics are all consistent with a musculoskeletal etiology.    She does not report chest pain dyspnea on exertion or other symptoms that would be more concerning for angina or worsening coronary artery disease. She remains on preventive medications.    One of her main concerns today was her blood pressure has been elevated recently. She had a day where the readings were recorded as high as 160 systolic at home. Subsequently the next day they came down to a more reasonable level and to her usual baseline within 2 days. She has had several days of normotensive readings. We discussed the uncertainty related to such as a phenomenon. Overall based on her past history and current recorded readings I do not see the need to change medication therapy. We spent some time today discussing her labs. She has also had a colonoscopy. She will get a mammogram done here in [place] tomorrow. Otherwise generally up-to-date with no need for further testing at this time.    She been told during the course of her above described illness that she had a heart murmur. She has no history of a murmur has not been brought up before.    ROS:  Complete review of systems is obtained.  Other than the specific considerations noted above complete review of systems is negative.          Objective:   Medications:  Current Outpatient Medications   Medication     aspirin 81 MG chewable tablet     atorvastatin (LIPITOR) 40 MG tablet     fluocinonide (LIDEX) 0.05 % solution     lisinopril-hydrochlorothiazide (PRINZIDE/ZESTORETIC) 10-12.5 MG per tablet     omeprazole (PRILOSEC) 20 MG CR capsule     No current facility-administered  medications for this visit.        Allergies:     Allergies   Allergen Reactions     Tetanus Toxoids Swelling        Social History     Socioeconomic History     Marital status:      Spouse name: Not on file     Number of children: Not on file     Years of education: Not on file     Highest education level: Not on file   Occupational History     Not on file   Tobacco Use     Smoking status: Never Smoker     Smokeless tobacco: Never Used   Substance and Sexual Activity     Alcohol use: Not on file     Drug use: Not on file     Sexual activity: Not on file   Other Topics Concern     Not on file   Social History Narrative     Not on file     Social Determinants of Health     Financial Resource Strain:      Difficulty of Paying Living Expenses:    Food Insecurity:      Worried About Running Out of Food in the Last Year:      Ran Out of Food in the Last Year:    Transportation Needs:      Lack of Transportation (Medical):      Lack of Transportation (Non-Medical):    Physical Activity:      Days of Exercise per Week:      Minutes of Exercise per Session:    Stress:      Feeling of Stress :    Social Connections:      Frequency of Communication with Friends and Family:      Frequency of Social Gatherings with Friends and Family:      Attends [employer] Services:      Active Member of Clubs or Organizations:      Attends Club or Organization Meetings:      Marital Status:    Intimate Partner Violence:      Fear of Current or Ex-Partner:      Emotionally Abused:      Physically Abused:      Sexually Abused:        Family History   Problem Relation Age of Onset     Hypertension Mother      Hypertension Father      Hypertension Brother      Cancer Father 86.00        Bladder Cancer     Esophageal Cancer Brother 34.00     Uterine Cancer Daughter      Breast Cancer Paternal Grandmother      Breast Cancer Paternal Aunt      Breast Cancer Paternal Aunt      Breast Cancer Paternal Aunt      Breast Cancer Paternal Aunt       Breast Cancer Paternal Aunt         Most Recent Immunizations   Administered Date(s) Administered     COVID-19,PF,Pfizer 02/02/2021     DTAP (<7y) 04/14/2014     DTaP, Unspecified 04/14/2014     Dtap, 5 Pertussis Antigens (DAPTACEL) 04/14/2014     FLU 6-35 months 10/31/2014     Flu, Unspecified 10/08/2017     T2p1-13 Novel Flu 12/20/2009     Influenza (High Dose) 3 valent vaccine 09/30/2019     Influenza (IIV3) PF 09/22/2014     Influenza Vaccine, 6+MO IM (QUADRIVALENT W/PRESERVATIVES) 10/14/2015     Pneumo Conj 13-V (2010&after) 04/10/2018     Pneumococcal 23 valent 03/29/2019     Tdap (Adacel,Boostrix) 04/14/2014     Zoster vaccine, live 01/07/2013        Wt Readings from Last 3 Encounters:   08/24/21 84.5 kg (186 lb 3.2 oz)   06/17/19 83.9 kg (185 lb)   10/06/17 82.1 kg (181 lb)        BP Readings from Last 6 Encounters:   08/24/21 136/78        Hemoglobin A1C   Date Value Ref Range Status   06/17/2019 6.2 (H) 3.5 - 6.0 % Final   06/09/2017 6.0 3.5 - 6.0 % Final   05/27/2016 5.8 3.5 - 6.0 % Final              PHYSICAL EXAM:    /78   Pulse 88   Wt 84.5 kg (186 lb 3.2 oz)   SpO2 98%   BMI 33.41 kg/m           General Appearance:    Alert, cooperative, no distress   Eyes:   No scleral icterus or conjunctival irritation       Lungs:     Clear to auscultation bilaterally, respirations unlabored, no wheezes or crackles   Heart:    Regular rate and rhythm,  No murmur   Abdomen:    Soft, no distention, no tenderness on palpation, no masses, no organomegaly     Extremities:  No edema, no joint swelling or redness, no evidence of any injuries   Skin:  No concerning skin findings, no suspicious moles, no rashes   Neurologic:  On gross examination there is no motor or sensory deficit.  Patient walks with a normal gait

## 2021-08-25 ENCOUNTER — ANCILLARY PROCEDURE (OUTPATIENT)
Dept: MAMMOGRAPHY | Facility: CLINIC | Age: 71
End: 2021-08-25
Attending: FAMILY MEDICINE
Payer: COMMERCIAL

## 2021-08-25 DIAGNOSIS — Z12.31 VISIT FOR SCREENING MAMMOGRAM: ICD-10-CM

## 2021-08-25 PROCEDURE — 77063 BREAST TOMOSYNTHESIS BI: CPT

## 2021-10-02 ENCOUNTER — HEALTH MAINTENANCE LETTER (OUTPATIENT)
Age: 71
End: 2021-10-02

## 2022-02-03 ENCOUNTER — TRANSFERRED RECORDS (OUTPATIENT)
Dept: HEALTH INFORMATION MANAGEMENT | Facility: CLINIC | Age: 72
End: 2022-02-03
Payer: COMMERCIAL

## 2022-05-06 ENCOUNTER — TRANSFERRED RECORDS (OUTPATIENT)
Dept: HEALTH INFORMATION MANAGEMENT | Facility: CLINIC | Age: 72
End: 2022-05-06
Payer: COMMERCIAL

## 2022-05-08 ENCOUNTER — HEALTH MAINTENANCE LETTER (OUTPATIENT)
Age: 72
End: 2022-05-08

## 2022-05-23 ENCOUNTER — TRANSFERRED RECORDS (OUTPATIENT)
Dept: HEALTH INFORMATION MANAGEMENT | Facility: CLINIC | Age: 72
End: 2022-05-23
Payer: COMMERCIAL

## 2022-06-21 ENCOUNTER — OFFICE VISIT (OUTPATIENT)
Dept: CARDIOLOGY | Facility: CLINIC | Age: 72
End: 2022-06-21
Payer: COMMERCIAL

## 2022-06-21 VITALS
BODY MASS INDEX: 34.55 KG/M2 | RESPIRATION RATE: 16 BRPM | SYSTOLIC BLOOD PRESSURE: 150 MMHG | HEIGHT: 63 IN | WEIGHT: 195 LBS | HEART RATE: 84 BPM | DIASTOLIC BLOOD PRESSURE: 90 MMHG

## 2022-06-21 DIAGNOSIS — I10 BENIGN ESSENTIAL HYPERTENSION: Primary | ICD-10-CM

## 2022-06-21 DIAGNOSIS — I49.3 PVC'S (PREMATURE VENTRICULAR CONTRACTIONS): ICD-10-CM

## 2022-06-21 DIAGNOSIS — E87.6 HYPOKALEMIA: ICD-10-CM

## 2022-06-21 DIAGNOSIS — E66.09 CLASS 1 OBESITY DUE TO EXCESS CALORIES WITHOUT SERIOUS COMORBIDITY WITH BODY MASS INDEX (BMI) OF 34.0 TO 34.9 IN ADULT: ICD-10-CM

## 2022-06-21 DIAGNOSIS — E66.811 CLASS 1 OBESITY DUE TO EXCESS CALORIES WITHOUT SERIOUS COMORBIDITY WITH BODY MASS INDEX (BMI) OF 34.0 TO 34.9 IN ADULT: ICD-10-CM

## 2022-06-21 PROCEDURE — 99204 OFFICE O/P NEW MOD 45 MIN: CPT | Performed by: INTERNAL MEDICINE

## 2022-06-21 RX ORDER — POTASSIUM CHLORIDE 750 MG/1
10 TABLET, EXTENDED RELEASE ORAL DAILY
COMMUNITY
Start: 2022-05-23 | End: 2022-08-21

## 2022-06-21 RX ORDER — LISINOPRIL AND HYDROCHLOROTHIAZIDE 12.5; 2 MG/1; MG/1
1 TABLET ORAL DAILY
Qty: 30 TABLET | Refills: 11 | Status: SHIPPED | OUTPATIENT
Start: 2022-06-21 | End: 2022-06-21

## 2022-06-21 RX ORDER — LISINOPRIL AND HYDROCHLOROTHIAZIDE 12.5; 2 MG/1; MG/1
1 TABLET ORAL DAILY
Qty: 90 TABLET | Refills: 3 | Status: SHIPPED | OUTPATIENT
Start: 2022-06-21 | End: 2023-06-19

## 2022-06-21 NOTE — LETTER
"6/21/2022    Simba John MD, MD  [address]  [facility] [address]    RE: Elzbieta Mendez       Dear Colleague,     I had the pleasure of seeing Elzbieta Mendez in the [facility].    [facility]   [address], [place] [address]   [email or web address]   OFFICE: [phone]     CARDIOLOGY CLINIC NOTE     Thank you, Dr. John, Simba BURNETTE, for asking the [facility] Heart Care team to see Ms. Elzbieta Mendez to evaluate Consult (Palpitations, hypertension)        Assessment/Recommendations   Assessment:    1. Palpitations due to PVCs - no complex arrhythmias noted on event monitor. Mildly symptomatic. Provided reassurance to the patient. Will also use supplemental magnesium.  2. Hypertension - not optimally controlled.  3. Hypokalemia - likely due to hydrochlorothiazide medication.  4. Obesity due to excess calories without complications. BMI 34.5    Plan:  1. Increase lisinopril/hctz to 20/12.5 mg.   2. Can stop potassium supplement  3. Start magnesium supplement  4. Check potassium in 2-3 weeks  5. Discussed dietary changes.  6. Follow up in August         History of Present Illness   Ms. Elzbieta Mendez is a 71 year old female with a significant past history of hypertension who presents for evaluation of palpitations and hypokalemia.     began to experience palpitations which are described as as her heart \"stopping and starting\".  Symptoms lasted for approximately 4 days before she presented to an ER where she resides for the winter in [place].  In the ER her symptoms correlated with PVCs on telemetry monitor.  She was also noted to have mildly reduced potassium levels at 3.6.  She was discharged with instructions to increase dietary potassium and order a 2-week cardiac event monitor.  The event monitor revealed no complex ectopy, no atrial fibrillation, and a few brief runs of " atrial tachycardia that were asymptomatic.  With increasing potassium in her diet and taking supplemental potassium her palpitations significantly reduced in frequency.  As she significantly increased the amount of calories in her diet she did gain 15 pounds over the past month.  She denies any exertional symptoms or limitations.    Other than noted above, Ms. Mendez denies any chest pain/pressure/tightness, shortness of breath at rest or with exertion, light headedness/dizziness, pre-syncope, syncope, lower extremity swelling, palpitations, paroxysmal nocturnal dyspnea (PND), or orthopnea.     Cardiac Problems and Cardiac Diagnostics     Most Recent Cardiac testing:  ECG dated 4/25/22 (personaly reviewed and interpreted): normal sinus rhythm. Normal ECG.    Event Monitor 5/26/22 -   3 short duration episodes of atrial tachycardia. Rates variable  Symptom of palpitations correlated with sinus rhythm without ectopy.  No complex ventricular arrhythmias noted.      Lexiscan NM Stress test (TELOS): dated 2/3/22 revealed   CONCLUSIONS:   1) Normal  vasodilator stress test.   2) stress EKG is reported separately.   3) Left Ventricular systolic function is normal overall with a calculated LVEF of 65 to 70 %   with normal wall motion.   4) Low-dose CT was used for attenuation correction and not for diagnostic purposes.          Medications  Allergies   Current Outpatient Medications   Medication Sig Dispense Refill     aspirin 81 MG chewable tablet Take 81 mg by mouth daily       atorvastatin (LIPITOR) 40 MG tablet Take 40 mg by mouth At Bedtime       lisinopril-hydrochlorothiazide (ZESTORETIC) 20-12.5 MG tablet Take 1 tablet by mouth daily 90 tablet 3     omeprazole (PRILOSEC) 20 MG CR capsule Take 20 mg by mouth as needed       potassium chloride ER (KLOR-CON M) 10 MEQ CR tablet Take 10 mEq by mouth daily       fluocinonide (LIDEX) 0.05 % solution KWADWO EXT TO THE SCALP 3 NIGHTS PER WEEK HS (Patient not taking: Reported  "on 6/21/2022)        Allergies   Allergen Reactions     Tetanus Toxoids Swelling        Physical Examination Review of Systems   Vitals: BP (!) 150/90 (BP Location: Left arm, Patient Position: Sitting, Cuff Size: Adult Large)   Pulse 84   Resp 16   Ht 1.6 m (5' 3\")   Wt 88.5 kg (195 lb)   BMI 34.54 kg/m    BMI= Body mass index is 34.54 kg/m .  Wt Readings from Last 3 Encounters:   06/21/22 88.5 kg (195 lb)   08/24/21 84.5 kg (186 lb 3.2 oz)   06/17/19 83.9 kg (185 lb)       General Appearance:   Pleasant female, appears stated age. no acute distress, overweight body habitus   ENT/Mouth: membranes moist, no apparent gingival bleeding.      EYES:  no scleral icterus, normal conjunctivae   Neck: no carotid bruits. supple   Respiratory:   lungs are clear to auscultation, no rales or wheezing, equal chest wall expansion    Cardiovascular:   Regular rhythm, normal rate. Normal first and second heart sounds with no murmurs, rubs, or gallops; Jugular venous pressure normal, no edema bilaterally    Abdomen/GI:  Soft, non-tender   Extremities: no cyanosis or clubbing   Skin: no xanthelasma, warm.    Heme/lymph/ Immunology No apparent bleeding noted.   Neurologic: Alert and oriented. normal gait, no tremors   Psychiatric: Pleasant, calm, appropriate affect.         Please refer above for cardiac ROS details.       Past History   Past Medical History:   Past Medical History:   Diagnosis Date     Arthritis 2005    in my thumb joints     Hearing problem     2010     Hypertension 2007     Reduced vision 2015    begining cateracts     Sensorineural hearing loss 2010     Tinnitus 2010        Past Surgical History:   Past Surgical History:   Procedure Laterality Date     GYN SURGERY  1985    i still have overies     HC REMOVAL OF TONSILS,<13 Y/O      Description: Tonsillectomy;  Recorded: 05/04/2007;     HYSTERECTOMY  1986     AK VAGINAL HYSTERECTOMY,UTERUS 250 GMS/<      Description: Vaginal Hysterectomy;  Recorded: 05/04/2007; "  Annotations: menorrhagia; benign     TONSILLECTOMY  1970     Z APPENDECTOMY      Description: Appendectomy;  Recorded: 05/04/2007;        Family History:   Family History   Problem Relation Age of Onset     Hypertension Mother      Hypertension Father      Hypertension Brother      Cancer Father 86.00        Bladder Cancer     Esophageal Cancer Brother 34.00     Uterine Cancer Daughter      Breast Cancer Paternal Grandmother      Breast Cancer Paternal Aunt      Breast Cancer Paternal Aunt      Breast Cancer Paternal Aunt      Breast Cancer Paternal Aunt      Breast Cancer Paternal Aunt        Social History:   Social History     Socioeconomic History     Marital status:      Spouse name: Not on file     Number of children: Not on file     Years of education: Not on file     Highest education level: Not on file   Occupational History     Not on file   Tobacco Use     Smoking status: Never Smoker     Smokeless tobacco: Never Used   Substance and Sexual Activity     Alcohol use: Not on file     Drug use: Not on file     Sexual activity: Not on file   Other Topics Concern     Not on file   Social History Narrative     Not on file     Social Determinants of Health     Financial Resource Strain: Not on file   Food Insecurity: Not on file   Transportation Needs: Not on file   Physical Activity: Not on file   Stress: Not on file   Social Connections: Not on file   Intimate Partner Violence: Not on file   Housing Stability: Not on file            Lab Results    Chemistry/lipid CBC Cardiac Enzymes/BNP/TSH/INR   Lab Results   Component Value Date    CHOL 171 09/03/2019    HDL 41 (L) 09/03/2019    TRIG 232 (H) 09/03/2019    BUN 13 09/03/2019     09/03/2019    CO2 24 09/03/2019    No results found for: WBC, HGB, HCT, MCV, PLT No results found for: CKTOTAL, CKMB, TROPONINI, BNP, TSH, INR             Thank you for allowing me to participate in the care of your patient.      Sincerely,     Gurinder Merida MD     M  [facility]  cc:   Referred Self,

## 2022-06-21 NOTE — PROGRESS NOTES
"  [facility]   [address], [place] [address]   [email or web address]   OFFICE: [phone]     CARDIOLOGY CLINIC NOTE     Thank you, Dr. John, Simba BURNETTE, for asking the [facility] Heart Care team to see Ms. Elzbieta Mendez to evaluate Consult (Palpitations, hypertension)        Assessment/Recommendations   Assessment:    Palpitations due to PVCs - no complex arrhythmias noted on event monitor. Mildly symptomatic. Provided reassurance to the patient. Will also use supplemental magnesium.  Hypertension - not optimally controlled.  Hypokalemia - likely due to hydrochlorothiazide medication.  Obesity due to excess calories without complications. BMI 34.5    Plan:  Increase lisinopril/hctz to 20/12.5 mg.   Can stop potassium supplement  Start magnesium supplement  Check potassium in 2-3 weeks  Discussed dietary changes.  Follow up in August         History of Present Illness   Ms. Elzbieta Mendez is a 71 year old female with a significant past history of hypertension who presents for evaluation of palpitations and hypokalemia.     began to experience palpitations which are described as as her heart \"stopping and starting\".  Symptoms lasted for approximately 4 days before she presented to an ER where she resides for the winter in [place].  In the ER her symptoms correlated with PVCs on telemetry monitor.  She was also noted to have mildly reduced potassium levels at 3.6.  She was discharged with instructions to increase dietary potassium and order a 2-week cardiac event monitor.  The event monitor revealed no complex ectopy, no atrial fibrillation, and a few brief runs of atrial tachycardia that were asymptomatic.  With increasing potassium in her diet and taking supplemental potassium her palpitations significantly reduced in frequency.  As she significantly increased the amount of calories in her diet she did gain 15 pounds over the " "past month.  She denies any exertional symptoms or limitations.    Other than noted above, Ms. Mendez denies any chest pain/pressure/tightness, shortness of breath at rest or with exertion, light headedness/dizziness, pre-syncope, syncope, lower extremity swelling, palpitations, paroxysmal nocturnal dyspnea (PND), or orthopnea.     Cardiac Problems and Cardiac Diagnostics     Most Recent Cardiac testing:  ECG dated 4/25/22 (personaly reviewed and interpreted): normal sinus rhythm. Normal ECG.    Event Monitor 5/26/22 -   3 short duration episodes of atrial tachycardia. Rates variable  Symptom of palpitations correlated with sinus rhythm without ectopy.  No complex ventricular arrhythmias noted.      Lexiscan NM Stress test ([employer]): dated 2/3/22 revealed   CONCLUSIONS:   1) Normal  vasodilator stress test.   2) stress EKG is reported separately.   3) Left Ventricular systolic function is normal overall with a calculated LVEF of 65 to 70 %   with normal wall motion.   4) Low-dose CT was used for attenuation correction and not for diagnostic purposes.          Medications  Allergies   Current Outpatient Medications   Medication Sig Dispense Refill     aspirin 81 MG chewable tablet Take 81 mg by mouth daily       atorvastatin (LIPITOR) 40 MG tablet Take 40 mg by mouth At Bedtime       lisinopril-hydrochlorothiazide (ZESTORETIC) 20-12.5 MG tablet Take 1 tablet by mouth daily 90 tablet 3     omeprazole (PRILOSEC) 20 MG CR capsule Take 20 mg by mouth as needed       potassium chloride ER (KLOR-CON M) 10 MEQ CR tablet Take 10 mEq by mouth daily       fluocinonide (LIDEX) 0.05 % solution KWADWO EXT TO THE SCALP 3 NIGHTS PER WEEK HS (Patient not taking: Reported on 6/21/2022)        Allergies   Allergen Reactions     Tetanus Toxoids Swelling        Physical Examination Review of Systems   Vitals: BP (!) 150/90 (BP Location: Left arm, Patient Position: Sitting, Cuff Size: Adult Large)   Pulse 84   Resp 16   Ht 1.6 m (5' 3\") "   Wt 88.5 kg (195 lb)   BMI 34.54 kg/m    BMI= Body mass index is 34.54 kg/m .  Wt Readings from Last 3 Encounters:   06/21/22 88.5 kg (195 lb)   08/24/21 84.5 kg (186 lb 3.2 oz)   06/17/19 83.9 kg (185 lb)       General Appearance:   Pleasant female, appears stated age. no acute distress, overweight body habitus   ENT/Mouth: membranes moist, no apparent gingival bleeding.      EYES:  no scleral icterus, normal conjunctivae   Neck: no carotid bruits. supple   Respiratory:   lungs are clear to auscultation, no rales or wheezing, equal chest wall expansion    Cardiovascular:   Regular rhythm, normal rate. Normal first and second heart sounds with no murmurs, rubs, or gallops; Jugular venous pressure normal, no edema bilaterally    Abdomen/GI:  Soft, non-tender   Extremities: no cyanosis or clubbing   Skin: no xanthelasma, warm.    Heme/lymph/ Immunology No apparent bleeding noted.   Neurologic: Alert and oriented. normal gait, no tremors   Psychiatric: Pleasant, calm, appropriate affect.         Please refer above for cardiac ROS details.       Past History   Past Medical History:   Past Medical History:   Diagnosis Date     Arthritis 2005    in my thumb joints     Hearing problem     2010     Hypertension 2007     Reduced vision 2015    begining cateracts     Sensorineural hearing loss 2010     Tinnitus 2010        Past Surgical History:   Past Surgical History:   Procedure Laterality Date     GYN SURGERY  1985    i still have overies     HC REMOVAL OF TONSILS,<13 Y/O      Description: Tonsillectomy;  Recorded: 05/04/2007;     HYSTERECTOMY  1986     NM VAGINAL HYSTERECTOMY,UTERUS 250 GMS/<      Description: Vaginal Hysterectomy;  Recorded: 05/04/2007;  Annotations: menorrhagia; benign     TONSILLECTOMY  1970     [facility] APPENDECTOMY      Description: Appendectomy;  Recorded: 05/04/2007;        Family History:   Family History   Problem Relation Age of Onset     Hypertension Mother      Hypertension Father       Hypertension Brother      Cancer Father 86.00        Bladder Cancer     Esophageal Cancer Brother 34.00     Uterine Cancer Daughter      Breast Cancer Paternal Grandmother      Breast Cancer Paternal Aunt      Breast Cancer Paternal Aunt      Breast Cancer Paternal Aunt      Breast Cancer Paternal Aunt      Breast Cancer Paternal Aunt        Social History:   Social History     Socioeconomic History     Marital status:      Spouse name: Not on file     Number of children: Not on file     Years of education: Not on file     Highest education level: Not on file   Occupational History     Not on file   Tobacco Use     Smoking status: Never Smoker     Smokeless tobacco: Never Used   Substance and Sexual Activity     Alcohol use: Not on file     Drug use: Not on file     Sexual activity: Not on file   Other Topics Concern     Not on file   Social History Narrative     Not on file     Social Determinants of Health     Financial Resource Strain: Not on file   Food Insecurity: Not on file   Transportation Needs: Not on file   Physical Activity: Not on file   Stress: Not on file   Social Connections: Not on file   Intimate Partner Violence: Not on file   Housing Stability: Not on file            Lab Results    Chemistry/lipid CBC Cardiac Enzymes/BNP/TSH/INR   Lab Results   Component Value Date    CHOL 171 09/03/2019    HDL 41 (L) 09/03/2019    TRIG 232 (H) 09/03/2019    BUN 13 09/03/2019     09/03/2019    CO2 24 09/03/2019    No results found for: WBC, HGB, HCT, MCV, PLT No results found for: CKTOTAL, CKMB, TROPONINI, BNP, TSH, INR

## 2022-06-21 NOTE — PATIENT INSTRUCTIONS
It was a pleasure to meet with you today.      Below is a summary of your visit.   I will change your lisinopril/hydrochlorothiazide tablet to 20 mg lisinopril and 12.5 mg hydrochlorothiazide. Take 1 tablet daily.  You can try stopping the potassium tablet and decrease the high calorie potassium foods (ie potatoes)  Try taking a magnesium supplement, such as Slow Mag, 1-2 tablets daily to help with your palpitations (PVCs)  Follow up with me in late August    We will call you to inform you of your test or procedure results within 3 business days of the test being performed.  If you do not hear from our office with the test results within 1 week please do not hesitate to call asking for these results.     Please do not hesitate to call the [facility] Heart Care clinic with any questions or concerns at [phone].     Sincerely,

## 2022-07-13 ENCOUNTER — TELEPHONE (OUTPATIENT)
Dept: CARDIOLOGY | Facility: CLINIC | Age: 72
End: 2022-07-13

## 2022-07-13 NOTE — TELEPHONE ENCOUNTER
Called patient to review recent elevated blood pressure reading.  Per [place] Community Measures guidelines, patients blood pressure is out of parameters and recheck blood pressure is recommended.    Last Blood Pressure: 150/90  Last Heart Rate: 84  Date: 6/21/22  Location: [facility]    Blood Pressure on 7/11: 104/72  Heart Rate: 74  Location: Home BP    Patient reported blood pressure updated in Epic. Blood pressure falls within [place] Community Measures guidelines.  Patient will follow up as previously advised.

## 2022-07-19 ENCOUNTER — LAB (OUTPATIENT)
Dept: CARDIOLOGY | Facility: CLINIC | Age: 72
End: 2022-07-19
Payer: COMMERCIAL

## 2022-07-19 DIAGNOSIS — I10 BENIGN ESSENTIAL HYPERTENSION: ICD-10-CM

## 2022-07-19 DIAGNOSIS — I49.3 PVC'S (PREMATURE VENTRICULAR CONTRACTIONS): ICD-10-CM

## 2022-07-19 DIAGNOSIS — E87.6 HYPOKALEMIA: ICD-10-CM

## 2022-07-19 LAB — POTASSIUM BLD-SCNC: 3.9 MMOL/L (ref 3.5–5)

## 2022-07-19 PROCEDURE — 84132 ASSAY OF SERUM POTASSIUM: CPT

## 2022-07-19 PROCEDURE — 36415 COLL VENOUS BLD VENIPUNCTURE: CPT

## 2022-08-25 ENCOUNTER — OFFICE VISIT (OUTPATIENT)
Dept: CARDIOLOGY | Facility: CLINIC | Age: 72
End: 2022-08-25
Attending: INTERNAL MEDICINE
Payer: COMMERCIAL

## 2022-08-25 VITALS
HEART RATE: 95 BPM | RESPIRATION RATE: 16 BRPM | OXYGEN SATURATION: 97 % | DIASTOLIC BLOOD PRESSURE: 90 MMHG | BODY MASS INDEX: 35 KG/M2 | SYSTOLIC BLOOD PRESSURE: 156 MMHG | WEIGHT: 197.6 LBS

## 2022-08-25 DIAGNOSIS — E66.812 CLASS 2 OBESITY DUE TO EXCESS CALORIES WITHOUT SERIOUS COMORBIDITY WITH BODY MASS INDEX (BMI) OF 35.0 TO 35.9 IN ADULT: ICD-10-CM

## 2022-08-25 DIAGNOSIS — E87.6 HYPOKALEMIA: ICD-10-CM

## 2022-08-25 DIAGNOSIS — E66.09 CLASS 2 OBESITY DUE TO EXCESS CALORIES WITHOUT SERIOUS COMORBIDITY WITH BODY MASS INDEX (BMI) OF 35.0 TO 35.9 IN ADULT: ICD-10-CM

## 2022-08-25 DIAGNOSIS — I10 BENIGN ESSENTIAL HYPERTENSION: ICD-10-CM

## 2022-08-25 DIAGNOSIS — I49.3 PVC'S (PREMATURE VENTRICULAR CONTRACTIONS): Primary | ICD-10-CM

## 2022-08-25 PROCEDURE — 99214 OFFICE O/P EST MOD 30 MIN: CPT | Performed by: INTERNAL MEDICINE

## 2022-08-25 NOTE — PATIENT INSTRUCTIONS
It was a pleasure to meet with you today.      Below is a summary of your visit.   Your PVCs seem well controlled as is your blood pressure.  Continue your medications without changes.  You can try stopping the magnesium if you wish to see if your palpitations worsen.  I encourage regular activity as tolerated  Follow up with me next summer after you return from [place].       Please do not hesitate to call the [facility] Heart Care clinic with any questions or concerns at [phone].     Sincerely,

## 2022-08-25 NOTE — LETTER
8/25/2022    Simba John MD, MD  [address]  [facility] [address]    RE: Elzbieta Mendez       Dear Colleague,     I had the pleasure of seeing Elzbieta Mendez in the [facility].    [facility]   [address], [place] [address]   [email or web address]   OFFICE: [phone]     CARDIOLOGY CLINIC NOTE     Thank you, Dr. John, Simba BURNETTE, for asking the [facility] team to see Ms. Elzbieta Mendez to  Follow Up (PVCs)        Assessment/Recommendations   Assessment:    1. Palpitations due to PVCs - no complex arrhythmias noted on event monitor. Mildly symptomatic. Provided reassurance to the patient.  2. Hypertension - elevated BP today, likely related to recent injury.  3. Hypokalemia - likely due to hydrochlorothiazide medication. Stable on last check.  4. Obesity due to excess calories without complications. BMI 35    Plan:  1. Continue medications without changes  2. Can stop magnesium supplement when desired to see if palpitations return.  3. Encouraged exercise as tolerated  4. Follow up next May/June after returning from [place].         History of Present Illness   Ms. Elzbieta Mendez is a 71 year old female with a significant past history of hypertension who presents for follow-up of PVCs.    Ms. Mendez is treated with magnesium supplement for her PVCs. Since starting magnesium she has noted a significant decrease in her palpitations. Her home blood pressure ranges from 102-127/60-75 mmHg, consistently. Yesterday she fell on the pavement, injuring her RLE and wrists. She is currently experiencing pain in both wrists and her right leg.    Other than noted above, Ms. Mendez denies any chest pain/pressure/tightness, shortness of breath at rest or with exertion, light headedness/dizziness, pre-syncope, syncope, lower extremity swelling, palpitations, paroxysmal nocturnal dyspnea (PND),  or orthopnea.     Cardiac Problems and Cardiac Diagnostics     Most Recent Cardiac testing:  ECG dated 4/25/22 (personaly reviewed and interpreted): normal sinus rhythm. Normal ECG.    Event Monitor 5/26/22 -   3 short duration episodes of atrial tachycardia. Rates variable  Symptom of palpitations correlated with sinus rhythm without ectopy.  No complex ventricular arrhythmias noted.      Lexiscan NM Stress test ([email or web address]): dated 2/3/22 revealed   CONCLUSIONS:   1) Normal  vasodilator stress test.   2) stress EKG is reported separately.   3) Left Ventricular systolic function is normal overall with a calculated LVEF of 65 to 70 %   with normal wall motion.   4) Low-dose CT was used for attenuation correction and not for diagnostic purposes.          Medications  Allergies   Current Outpatient Medications   Medication Sig Dispense Refill     aspirin 81 MG chewable tablet Take 81 mg by mouth daily       atorvastatin (LIPITOR) 40 MG tablet Take 40 mg by mouth At Bedtime       lisinopril-hydrochlorothiazide (ZESTORETIC) 20-12.5 MG tablet Take 1 tablet by mouth daily 90 tablet 3     omeprazole (PRILOSEC) 20 MG CR capsule Take 20 mg by mouth as needed       POTASSIUM PO Take 10 mg by mouth daily       fluocinonide (LIDEX) 0.05 % solution KWADWO EXT TO THE SCALP 3 NIGHTS PER WEEK HS (Patient not taking: No sig reported)        Allergies   Allergen Reactions     Tetanus Toxoids Swelling        Physical Examination Review of Systems   Vitals: BP (!) 156/90 (BP Location: Left arm, Patient Position: Sitting, Cuff Size: Adult Large)   Pulse 95   Resp 16   Wt 89.6 kg (197 lb 9.6 oz)   SpO2 97%   BMI 35.00 kg/m    BMI= Body mass index is 35 kg/m .  Wt Readings from Last 3 Encounters:   08/25/22 89.6 kg (197 lb 9.6 oz)   06/21/22 88.5 kg (195 lb)   08/24/21 84.5 kg (186 lb 3.2 oz)       General Appearance:   Pleasant female, appears stated age. no acute distress, overweight body habitus   ENT/Mouth: Wearing a mask    EYES:  no  scleral icterus, normal conjunctivae   Neck:  supple   Respiratory:   lungs are clear to auscultation, no rales or wheezing, equal chest wall expansion    Cardiovascular:   Regular rhythm, normal rate. Normal first and second heart sounds with no murmurs, rubs, or gallops; Jugular venous pressure normal, no edema bilaterally    Extremities: no cyanosis or clubbing   Skin: no xanthelasma, warm.    Heme/lymph/ Immunology No apparent bleeding noted.   Neurologic: Alert and oriented. no tremors   Psychiatric: Pleasant, calm, appropriate affect.         Please refer above for cardiac ROS details.       Past History   Past Medical History:   Past Medical History:   Diagnosis Date     Arthritis 2005    in my thumb joints     Hearing problem     2010     Hypertension 2007     Reduced vision 2015    begining cateracts     Sensorineural hearing loss 2010     Tinnitus 2010        Past Surgical History:   Past Surgical History:   Procedure Laterality Date     GYN SURGERY  1985    i still have overies     HC REMOVAL OF TONSILS,<15 Y/O      Description: Tonsillectomy;  Recorded: 05/04/2007;     HYSTERECTOMY  1986     AR VAGINAL HYSTERECTOMY,UTERUS 250 GMS/<      Description: Vaginal Hysterectomy;  Recorded: 05/04/2007;  Annotations: menorrhagia; benign     TONSILLECTOMY  1970     Z APPENDECTOMY      Description: Appendectomy;  Recorded: 05/04/2007;        Family History:   Family History   Problem Relation Age of Onset     Hypertension Mother      Hypertension Father      Hypertension Brother      Cancer Father 86.00        Bladder Cancer     Esophageal Cancer Brother 34.00     Uterine Cancer Daughter      Breast Cancer Paternal Grandmother      Breast Cancer Paternal Aunt      Breast Cancer Paternal Aunt      Breast Cancer Paternal Aunt      Breast Cancer Paternal Aunt      Breast Cancer Paternal Aunt        Social History:   Social History     Socioeconomic History     Marital status:      Spouse name: Not on file      Number of children: Not on file     Years of education: Not on file     Highest education level: Not on file   Occupational History     Not on file   Tobacco Use     Smoking status: Never Smoker     Smokeless tobacco: Never Used   Substance and Sexual Activity     Alcohol use: Not on file     Drug use: Not on file     Sexual activity: Not on file   Other Topics Concern     Not on file   Social History Narrative     Not on file     Social Determinants of Health     Financial Resource Strain: Not on file   Food Insecurity: Not on file   Transportation Needs: Not on file   Physical Activity: Not on file   Stress: Not on file   Social Connections: Not on file   Intimate Partner Violence: Not on file   Housing Stability: Not on file            Lab Results    Chemistry/lipid CBC Cardiac Enzymes/BNP/TSH/INR   Lab Results   Component Value Date    CHOL 171 09/03/2019    HDL 41 (L) 09/03/2019    TRIG 232 (H) 09/03/2019    BUN 13 09/03/2019     09/03/2019    CO2 24 09/03/2019    No results found for: WBC, HGB, HCT, MCV, PLT No results found for: CKTOTAL, CKMB, TROPONINI, BNP, TSH, INR             Thank you for allowing me to participate in the care of your patient.      Sincerely,     Gurinder Merida MD     [facility]  cc:   Gurinder Merida MD  [address], [address]  [place] [id number]

## 2022-08-25 NOTE — PROGRESS NOTES
[facility] HEART CARE   [address], [place] [address]   [email or web address]   OFFICE: [phone]     CARDIOLOGY CLINIC NOTE     Thank you, Simba Coto, for asking the [facility] Heart Care team to see Ms. Elzbieta Mendez to  Follow Up (PVCs)        Assessment/Recommendations   Assessment:    Palpitations due to PVCs - no complex arrhythmias noted on event monitor. Mildly symptomatic. Provided reassurance to the patient.  Hypertension - elevated BP today, likely related to recent injury.  Hypokalemia - likely due to hydrochlorothiazide medication. Stable on last check.  Obesity due to excess calories without complications. BMI 35    Plan:  Continue medications without changes  Can stop magnesium supplement when desired to see if palpitations return.  Encouraged exercise as tolerated  Follow up next May/June after returning from [place].         History of Present Illness   Ms. Elzbieta Mendez is a 71 year old female with a significant past history of hypertension who presents for follow-up of PVCs.    Ms. Mendez is treated with magnesium supplement for her PVCs. Since starting magnesium she has noted a significant decrease in her palpitations. Her home blood pressure ranges from 102-127/60-75 mmHg, consistently. Yesterday she fell on the pavement, injuring her RLE and wrists. She is currently experiencing pain in both wrists and her right leg.    Other than noted above, Ms. Mendez denies any chest pain/pressure/tightness, shortness of breath at rest or with exertion, light headedness/dizziness, pre-syncope, syncope, lower extremity swelling, palpitations, paroxysmal nocturnal dyspnea (PND), or orthopnea.     Cardiac Problems and Cardiac Diagnostics     Most Recent Cardiac testing:  ECG dated 4/25/22 (personaly reviewed and interpreted): normal sinus rhythm. Normal ECG.    Event Monitor 5/26/22 -   3 short duration episodes of atrial  tachycardia. Rates variable  Symptom of palpitations correlated with sinus rhythm without ectopy.  No complex ventricular arrhythmias noted.      Lexiscan NM Stress test ( [facility]): dated 2/3/22 revealed   CONCLUSIONS:   1) Normal  vasodilator stress test.   2) stress EKG is reported separately.   3) Left Ventricular systolic function is normal overall with a calculated LVEF of 65 to 70 %   with normal wall motion.   4) Low-dose CT was used for attenuation correction and not for diagnostic purposes.          Medications  Allergies   Current Outpatient Medications   Medication Sig Dispense Refill     aspirin 81 MG chewable tablet Take 81 mg by mouth daily       atorvastatin (LIPITOR) 40 MG tablet Take 40 mg by mouth At Bedtime       lisinopril-hydrochlorothiazide (ZESTORETIC) 20-12.5 MG tablet Take 1 tablet by mouth daily 90 tablet 3     omeprazole (PRILOSEC) 20 MG CR capsule Take 20 mg by mouth as needed       POTASSIUM PO Take 10 mg by mouth daily       fluocinonide (LIDEX) 0.05 % solution KWADWO EXT TO THE SCALP 3 NIGHTS PER WEEK HS (Patient not taking: No sig reported)        Allergies   Allergen Reactions     Tetanus Toxoids Swelling        Physical Examination Review of Systems   Vitals: BP (!) 156/90 (BP Location: Left arm, Patient Position: Sitting, Cuff Size: Adult Large)   Pulse 95   Resp 16   Wt 89.6 kg (197 lb 9.6 oz)   SpO2 97%   BMI 35.00 kg/m    BMI= Body mass index is 35 kg/m .  Wt Readings from Last 3 Encounters:   08/25/22 89.6 kg (197 lb 9.6 oz)   06/21/22 88.5 kg (195 lb)   08/24/21 84.5 kg (186 lb 3.2 oz)       General Appearance:   Pleasant female, appears stated age. no acute distress, overweight body habitus   ENT/Mouth: Wearing a mask    EYES:  no scleral icterus, normal conjunctivae   Neck:  supple   Respiratory:   lungs are clear to auscultation, no rales or wheezing, equal chest wall expansion    Cardiovascular:   Regular rhythm, normal rate. Normal first and second heart sounds with no  murmurs, rubs, or gallops; Jugular venous pressure normal, no edema bilaterally    Extremities: no cyanosis or clubbing   Skin: no xanthelasma, warm.    Heme/lymph/ Immunology No apparent bleeding noted.   Neurologic: Alert and oriented. no tremors   Psychiatric: Pleasant, calm, appropriate affect.         Please refer above for cardiac ROS details.       Past History   Past Medical History:   Past Medical History:   Diagnosis Date     Arthritis 2005    in my thumb joints     Hearing problem     2010     Hypertension 2007     Reduced vision 2015    begining cateracts     Sensorineural hearing loss 2010     Tinnitus 2010        Past Surgical History:   Past Surgical History:   Procedure Laterality Date     GYN SURGERY  1985    i still have overies     HC REMOVAL OF TONSILS,<15 Y/O      Description: Tonsillectomy;  Recorded: 05/04/2007;     HYSTERECTOMY  1986     MO VAGINAL HYSTERECTOMY,UTERUS 250 GMS/<      Description: Vaginal Hysterectomy;  Recorded: 05/04/2007;  Annotations: menorrhagia; benign     TONSILLECTOMY  1970     ZZC APPENDECTOMY      Description: Appendectomy;  Recorded: 05/04/2007;        Family History:   Family History   Problem Relation Age of Onset     Hypertension Mother      Hypertension Father      Hypertension Brother      Cancer Father 86.00        Bladder Cancer     Esophageal Cancer Brother 34.00     Uterine Cancer Daughter      Breast Cancer Paternal Grandmother      Breast Cancer Paternal Aunt      Breast Cancer Paternal Aunt      Breast Cancer Paternal Aunt      Breast Cancer Paternal Aunt      Breast Cancer Paternal Aunt        Social History:   Social History     Socioeconomic History     Marital status:      Spouse name: Not on file     Number of children: Not on file     Years of education: Not on file     Highest education level: Not on file   Occupational History     Not on file   Tobacco Use     Smoking status: Never Smoker     Smokeless tobacco: Never Used   Substance and  Sexual Activity     Alcohol use: Not on file     Drug use: Not on file     Sexual activity: Not on file   Other Topics Concern     Not on file   Social History Narrative     Not on file     Social Determinants of Health     Financial Resource Strain: Not on file   Food Insecurity: Not on file   Transportation Needs: Not on file   Physical Activity: Not on file   Stress: Not on file   Social Connections: Not on file   Intimate Partner Violence: Not on file   Housing Stability: Not on file            Lab Results    Chemistry/lipid CBC Cardiac Enzymes/BNP/TSH/INR   Lab Results   Component Value Date    CHOL 171 09/03/2019    HDL 41 (L) 09/03/2019    TRIG 232 (H) 09/03/2019    BUN 13 09/03/2019     09/03/2019    CO2 24 09/03/2019    No results found for: WBC, HGB, HCT, MCV, PLT No results found for: CKTOTAL, CKMB, TROPONINI, BNP, TSH, INR

## 2022-08-26 ENCOUNTER — ANCILLARY PROCEDURE (OUTPATIENT)
Dept: MAMMOGRAPHY | Facility: CLINIC | Age: 72
End: 2022-08-26
Attending: FAMILY MEDICINE
Payer: COMMERCIAL

## 2022-08-26 DIAGNOSIS — Z12.31 VISIT FOR SCREENING MAMMOGRAM: ICD-10-CM

## 2022-08-26 PROCEDURE — 77067 SCR MAMMO BI INCL CAD: CPT

## 2022-09-09 ENCOUNTER — TELEPHONE (OUTPATIENT)
Dept: CARDIOLOGY | Facility: CLINIC | Age: 72
End: 2022-09-09

## 2022-09-09 NOTE — TELEPHONE ENCOUNTER
Last Blood Pressure: 156/90  Last Heart Rate: 95  Date: 8/25/22  Location: [facility]    9/7/22 Blood Pressure: 106/71   Heart Rate: 85  Location: Home BP     [facility] [employer]     Patient reported blood pressure updated in Epic. Blood pressure falls within [place] Community Measures guidelines.  Patient will follow up as previously advised.

## 2023-01-14 ENCOUNTER — HEALTH MAINTENANCE LETTER (OUTPATIENT)
Age: 73
End: 2023-01-14

## 2023-04-21 PROBLEM — L20 ATOPIC DERMATITIS: Status: ACTIVE | Noted: 2023-04-21

## 2023-04-27 ENCOUNTER — APPOINTMENT (RX ONLY)
Age: 73
Setting detail: DERMATOLOGY
End: 2023-04-27

## 2023-04-27 ENCOUNTER — APPOINTMENT (OUTPATIENT)
Age: 73
Setting detail: DERMATOLOGY
End: 2023-04-27

## 2023-04-27 DIAGNOSIS — L40.4 GUTTATE PSORIASIS: ICD-10-CM | Status: INADEQUATELY CONTROLLED

## 2023-04-27 DIAGNOSIS — L57.0 ACTINIC KERATOSIS: ICD-10-CM

## 2023-04-27 PROCEDURE — 17003 DESTRUCT PREMALG LES 2-14: CPT

## 2023-04-27 PROCEDURE — ? COUNSELING

## 2023-04-27 PROCEDURE — ? PRESCRIPTION

## 2023-04-27 PROCEDURE — 17000 DESTRUCT PREMALG LESION: CPT

## 2023-04-27 PROCEDURE — ? LIQUID NITROGEN

## 2023-04-27 PROCEDURE — 99204 OFFICE O/P NEW MOD 45 MIN: CPT | Mod: 25

## 2023-04-27 RX ORDER — ROFLUMILAST 3 MG/G
QD CREAM TOPICAL QD
Qty: 60 | Refills: 11 | Status: ERX | COMMUNITY
Start: 2023-04-27

## 2023-04-27 RX ADMIN — ROFLUMILAST QD: 3 CREAM TOPICAL at 00:00

## 2023-04-27 ASSESSMENT — LOCATION DETAILED DESCRIPTION DERM
LOCATION DETAILED: LEFT CENTRAL MALAR CHEEK
LOCATION DETAILED: LEFT UPPER CUTANEOUS LIP
LOCATION DETAILED: LEFT CENTRAL MALAR CHEEK
LOCATION DETAILED: LEFT PROXIMAL DORSAL FOREARM
LOCATION DETAILED: LEFT PROXIMAL DORSAL FOREARM
LOCATION DETAILED: PHILTRUM
LOCATION DETAILED: PHILTRUM
LOCATION DETAILED: LEFT UPPER CUTANEOUS LIP

## 2023-04-27 ASSESSMENT — LOCATION SIMPLE DESCRIPTION DERM
LOCATION SIMPLE: UPPER LIP
LOCATION SIMPLE: LEFT FOREARM
LOCATION SIMPLE: LEFT CHEEK
LOCATION SIMPLE: LEFT LIP
LOCATION SIMPLE: LEFT CHEEK
LOCATION SIMPLE: LEFT FOREARM
LOCATION SIMPLE: LEFT LIP
LOCATION SIMPLE: UPPER LIP

## 2023-04-27 ASSESSMENT — LOCATION ZONE DERM
LOCATION ZONE: LIP
LOCATION ZONE: FACE
LOCATION ZONE: ARM
LOCATION ZONE: ARM
LOCATION ZONE: LIP
LOCATION ZONE: FACE

## 2023-04-27 NOTE — PROCEDURE: LIQUID NITROGEN
Detail Level: Detailed
Show Aperture Variable?: Yes
Post-Care Instructions: I reviewed with the patient in detail post-care instructions. Patient is to wear sunprotection, and avoid picking at any of the treated lesions. Pt may apply Vaseline to crusted or scabbing areas.
Consent: The patient's consent was obtained including but not limited to risks of crusting, scabbing, blistering, scarring, darker or lighter pigmentary change, recurrence, incomplete removal and infection.
Number Of Freeze-Thaw Cycles: 3 freeze-thaw cycles
Duration Of Freeze Thaw-Cycle (Seconds): 0
Render Post-Care Instructions In Note?: no

## 2023-05-05 ENCOUNTER — RX ONLY (OUTPATIENT)
Age: 73
Setting detail: RX ONLY
End: 2023-05-05

## 2023-05-05 ENCOUNTER — RX ONLY (RX ONLY)
Age: 73
End: 2023-05-05

## 2023-05-05 RX ORDER — ROFLUMILAST 3 MG/G
CREAM TOPICAL
Qty: 60 | Refills: 8 | Status: ERX

## 2023-05-09 ENCOUNTER — APPOINTMENT (OUTPATIENT)
Age: 73
Setting detail: DERMATOLOGY
End: 2023-05-09

## 2023-05-09 ENCOUNTER — APPOINTMENT (RX ONLY)
Age: 73
Setting detail: DERMATOLOGY
End: 2023-05-09

## 2023-05-09 DIAGNOSIS — Z48.817 ENCOUNTER FOR SURGICAL AFTERCARE FOLLOWING SURGERY ON THE SKIN AND SUBCUTANEOUS TISSUE: ICD-10-CM

## 2023-05-09 PROCEDURE — ? COUNSELING

## 2023-05-09 ASSESSMENT — LOCATION DETAILED DESCRIPTION DERM
LOCATION DETAILED: LEFT PROXIMAL DORSAL FOREARM
LOCATION DETAILED: LEFT PROXIMAL DORSAL FOREARM

## 2023-05-09 ASSESSMENT — LOCATION SIMPLE DESCRIPTION DERM
LOCATION SIMPLE: LEFT FOREARM
LOCATION SIMPLE: LEFT FOREARM

## 2023-05-09 ASSESSMENT — LOCATION ZONE DERM
LOCATION ZONE: ARM
LOCATION ZONE: ARM

## 2023-06-02 ENCOUNTER — HEALTH MAINTENANCE LETTER (OUTPATIENT)
Age: 73
End: 2023-06-02

## 2023-06-19 ENCOUNTER — OFFICE VISIT (OUTPATIENT)
Dept: CARDIOLOGY | Facility: CLINIC | Age: 73
End: 2023-06-19
Payer: COMMERCIAL

## 2023-06-19 VITALS
DIASTOLIC BLOOD PRESSURE: 72 MMHG | SYSTOLIC BLOOD PRESSURE: 148 MMHG | WEIGHT: 195 LBS | RESPIRATION RATE: 16 BRPM | HEIGHT: 63 IN | BODY MASS INDEX: 34.55 KG/M2 | HEART RATE: 87 BPM

## 2023-06-19 DIAGNOSIS — E87.6 HYPOKALEMIA: ICD-10-CM

## 2023-06-19 DIAGNOSIS — I10 BENIGN ESSENTIAL HYPERTENSION: ICD-10-CM

## 2023-06-19 DIAGNOSIS — I49.3 PVC'S (PREMATURE VENTRICULAR CONTRACTIONS): Primary | ICD-10-CM

## 2023-06-19 PROCEDURE — 99214 OFFICE O/P EST MOD 30 MIN: CPT | Performed by: INTERNAL MEDICINE

## 2023-06-19 RX ORDER — LISINOPRIL AND HYDROCHLOROTHIAZIDE 12.5; 2 MG/1; MG/1
1 TABLET ORAL DAILY
Qty: 90 TABLET | Refills: 3 | Status: SHIPPED | OUTPATIENT
Start: 2023-06-19

## 2023-06-19 NOTE — PATIENT INSTRUCTIONS
It was a pleasure to meet with you today.      Below is a summary of your visit.   Continue your medications without changes.  I encourage regular exercise. The more you get, the better  Follow up with me in a year or sooner if needed.     Please do not hesitate to call the [facility] with any questions or concerns at [phone].     Sincerely,

## 2023-06-19 NOTE — LETTER
6/19/2023    Simba John MD, MD  [address]  [facility] [address]    RE: Elzbieta Mendez       Dear Colleague,     I had the pleasure of seeing Elzbieta Mendez in the [facility].    [facility] HEART CARE   [address]  [place] [address]   [email or web address]   OFFICE: [phone]     CARDIOLOGY CLINIC NOTE     Thank you, Dr. John, Simba BURNETTE, for asking the [facility] team to see Ms. Elzbieta Mendez to Follow Up (PVCs)        Assessment/Recommendations   Assessment:    Palpitations due to PVCs - no complex arrhythmias noted on event monitor. Mildly symptomatic. Provided reassurance to the patient.  Hypertension - elevated BP today but controlled on home readings (115-125 / 75-80 mmHg)  Hypokalemia - likely due to hydrochlorothiazide medication. potassium was 4.5 in March at [facility]..  Obesity due to excess calories without complications. BMI 34.5    Plan:  Continue medications without changes.  Discussed weight loss through diet and exercise  Follow up in 1 year or sooner if needed.         History of Present Illness   Ms. Elzbieta Mendez is a 72 year old female with a significant past history of hypertension who presents for follow-up of PVCs.     Ms. Mendez is treated with magnesium supplement for her PVCs. She had a significant symptomatic improvement with magnesium supplementation which was discontinued in 2022.     Elzbieta developed palpitations again last fall after having a cortisone injection for arthritic pain. Her symptoms eventually subsided. She is currently feeling well and has no acute complaints.    Other than noted above, Ms. Mendez denies any chest pain/pressure/tightness, shortness of breath at rest or with exertion, light headedness/dizziness, pre-syncope, syncope, lower extremity swelling, palpitations, paroxysmal nocturnal dyspnea (PND), or orthopnea.     Cardiac  Problems and Cardiac Diagnostics     Most Recent Cardiac testing:    Event Monitor 5/26/22 -   3 short duration episodes of atrial tachycardia. Rates variable  Symptom of palpitations correlated with sinus rhythm without ectopy.  No complex ventricular arrhythmias noted.    ECHO (report reviewed):   TTE 11/8/22- [facility]  Left Ventricle: Global systolic function: Left ventricular systolic function is normal. Ejection fraction is 60 - 65 % . Regional systolic function: Wall motion: Regional function is probably normal. Some small LV segments are not well visualized.   Right Ventricle: Right ventricular size and function are normal.   Left Atrium: The left atrium is mildly enlarged.   Tricuspid Valve: PA systolic pressure cannot be estimated due to poor tricuspid regurgitant envelope.   Systemic Veins: Inferior vena cava is not well visualized. Right atrial pressure cannot be estimated.     Lexiscan NM Stress test ( [facility]): dated 2/3/22 revealed   CONCLUSIONS:   1) Normal  vasodilator stress test.   2) stress EKG is reported separately.   3) Left Ventricular systolic function is normal overall with a calculated LVEF of 65 to 70 %   with normal wall motion.   4) Low-dose CT was used for attenuation correction and not for diagnostic purposes.        Medications  Allergies   Current Outpatient Medications   Medication Sig Dispense Refill    aspirin 81 MG chewable tablet Take 81 mg by mouth daily      atorvastatin (LIPITOR) 40 MG tablet Take 40 mg by mouth At Bedtime      lisinopril-hydrochlorothiazide (ZESTORETIC) 20-12.5 MG tablet Take 1 tablet by mouth daily 90 tablet 3    omeprazole (PRILOSEC) 20 MG CR capsule Take 20 mg by mouth as needed        Allergies   Allergen Reactions    Corticosteroids Diarrhea and Palpitations     Other reaction(s): Irregular Heart Rate, Irregular Heart Rate    Hydrocortisone      Other reaction(s): Other (See Comments)  .    Tetanus Toxoids Swelling        Physical  "Examination Review of Systems   Vitals: BP (!) 148/72   Pulse 87   Resp 16   Ht 1.6 m (5' 3\")   Wt 88.5 kg (195 lb)   BMI 34.54 kg/m    BMI= Body mass index is 34.54 kg/m .  Wt Readings from Last 3 Encounters:   06/19/23 88.5 kg (195 lb)   08/25/22 89.6 kg (197 lb 9.6 oz)   06/21/22 88.5 kg (195 lb)       General: pleasant female. No acute distress.   HENT: external ears normal. Nares patent. Mucous membranes moist.  Eyes: perrla, extraocular muscles intact. No scleral icterus.   Neck: No JVD  Lungs: clear to auscultation  COR: regular rate and rhythm, No murmurs, rubs, or gallops  Abd: nondistended, BS present  Extrem: No edema        Please refer above for cardiac ROS details.       Past History   Past Medical History:   Past Medical History:   Diagnosis Date    Arthritis 2005    in my thumb joints    Hearing problem     2010    Hypertension 2007    Reduced vision 2015    begining cateracts    Sensorineural hearing loss 2010    Tinnitus 2010        Past Surgical History:   Past Surgical History:   Procedure Laterality Date    GYN SURGERY  1985    i still have overies    HC REMOVAL OF TONSILS,<15 Y/O      Description: Tonsillectomy;  Recorded: 05/04/2007;    HYSTERECTOMY  1986    MD VAGINAL HYSTERECTOMY,UTERUS 250 GMS/<      Description: Vaginal Hysterectomy;  Recorded: 05/04/2007;  Annotations: menorrhagia; benign    TONSILLECTOMY  1970    ZZC APPENDECTOMY      Description: Appendectomy;  Recorded: 05/04/2007;        Family History:   Family History   Problem Relation Age of Onset    Hypertension Mother     Hypertension Father     Cancer Father 86        Bladder Cancer    Uterine Cancer Daughter     Breast Cancer Paternal Grandmother 30    Hypertension Brother     Esophageal Cancer Brother 34    Breast Cancer Paternal Aunt     Breast Cancer Paternal Aunt     Breast Cancer Paternal Aunt     Breast Cancer Paternal Aunt     Breast Cancer Paternal Aunt     Ovarian Cancer No family hx of        Social History: "   Social History     Socioeconomic History    Marital status:      Spouse name: Not on file    Number of children: Not on file    Years of education: Not on file    Highest education level: Not on file   Occupational History    Not on file   Tobacco Use    Smoking status: Never    Smokeless tobacco: Never   Vaping Use    Vaping status: Not on file   Substance and Sexual Activity    Alcohol use: Not on file    Drug use: Not on file    Sexual activity: Not on file   Other Topics Concern    Not on file   Social History Narrative    Not on file     Social Determinants of Health     Financial Resource Strain: Not on file   Food Insecurity: Not on file   Transportation Needs: Not on file   Physical Activity: Not on file   Stress: Not on file   Social Connections: Not on file   Intimate Partner Violence: Not on file   Housing Stability: Not on file            Lab Results    Chemistry/lipid CBC Cardiac Enzymes/BNP/TSH/INR   Lab Results   Component Value Date    CHOL 171 09/03/2019    HDL 41 (L) 09/03/2019    TRIG 232 (H) 09/03/2019    BUN 13 09/03/2019     09/03/2019    CO2 24 09/03/2019    No results found for: WBC, HGB, HCT, MCV, PLT No results found for: CKTOTAL, CKMB, TROPONINI, BNP, TSH, INR             Thank you for allowing me to participate in the care of your patient.      Sincerely,     Gurinder Merida MD     [facility]  cc:   No referring provider defined for this encounter.

## 2023-06-19 NOTE — PROGRESS NOTES
[facility]   [address]  [place] [address]   [email or web address]   OFFICE: [phone]     CARDIOLOGY CLINIC NOTE     Thank you, Simba Coto, for asking the [facility] team to see Ms. Elzbieta Mendez to Follow Up (PVCs)        Assessment/Recommendations   Assessment:    1. Palpitations due to PVCs - no complex arrhythmias noted on event monitor. Mildly symptomatic. Provided reassurance to the patient.  2. Hypertension - elevated BP today but controlled on home readings (115-125 / 75-80 mmHg)  3. Hypokalemia - likely due to hydrochlorothiazide medication. potassium was 4.5 in March at [facility]..  4. Obesity due to excess calories without complications. BMI 34.5    Plan:  1. Continue medications without changes.  2. Discussed weight loss through diet and exercise  3. Follow up in 1 year or sooner if needed.         History of Present Illness   Ms. Elzbieta Mendez is a 72 year old female with a significant past history of hypertension who presents for follow-up of PVCs.     Ms. Mendez is treated with magnesium supplement for her PVCs. She had a significant symptomatic improvement with magnesium supplementation which was discontinued in 2022.     Elzbieta developed palpitations again last fall after having a cortisone injection for arthritic pain. Her symptoms eventually subsided. She is currently feeling well and has no acute complaints.    Other than noted above, Ms. Mendez denies any chest pain/pressure/tightness, shortness of breath at rest or with exertion, light headedness/dizziness, pre-syncope, syncope, lower extremity swelling, palpitations, paroxysmal nocturnal dyspnea (PND), or orthopnea.     Cardiac Problems and Cardiac Diagnostics     Most Recent Cardiac testing:    Event Monitor 5/26/22 -   3 short duration episodes of atrial tachycardia. Rates variable  Symptom of palpitations correlated with sinus rhythm  "without ectopy.  No complex ventricular arrhythmias noted.    ECHO (report reviewed):   TTE 11/8/22- [facility]  Left Ventricle: Global systolic function: Left ventricular systolic function is normal. Ejection fraction is 60 - 65 % . Regional systolic function: Wall motion: Regional function is probably normal. Some small LV segments are not well visualized.   Right Ventricle: Right ventricular size and function are normal.   Left Atrium: The left atrium is mildly enlarged.   Tricuspid Valve: PA systolic pressure cannot be estimated due to poor tricuspid regurgitant envelope.   Systemic Veins: Inferior vena cava is not well visualized. Right atrial pressure cannot be estimated.     Lexiscan NM Stress test ( [facility]): dated 2/3/22 revealed   CONCLUSIONS:   1) Normal  vasodilator stress test.   2) stress EKG is reported separately.   3) Left Ventricular systolic function is normal overall with a calculated LVEF of 65 to 70 %   with normal wall motion.   4) Low-dose CT was used for attenuation correction and not for diagnostic purposes.        Medications  Allergies   Current Outpatient Medications   Medication Sig Dispense Refill     aspirin 81 MG chewable tablet Take 81 mg by mouth daily       atorvastatin (LIPITOR) 40 MG tablet Take 40 mg by mouth At Bedtime       lisinopril-hydrochlorothiazide (ZESTORETIC) 20-12.5 MG tablet Take 1 tablet by mouth daily 90 tablet 3     omeprazole (PRILOSEC) 20 MG CR capsule Take 20 mg by mouth as needed        Allergies   Allergen Reactions     Corticosteroids Diarrhea and Palpitations     Other reaction(s): Irregular Heart Rate, Irregular Heart Rate     Hydrocortisone      Other reaction(s): Other (See Comments)  .     Tetanus Toxoids Swelling        Physical Examination Review of Systems   Vitals: BP (!) 148/72   Pulse 87   Resp 16   Ht 1.6 m (5' 3\")   Wt 88.5 kg (195 lb)   BMI 34.54 kg/m    BMI= Body mass index is 34.54 kg/m .  Wt Readings from Last 3 Encounters: "   06/19/23 88.5 kg (195 lb)   08/25/22 89.6 kg (197 lb 9.6 oz)   06/21/22 88.5 kg (195 lb)       General: pleasant female. No acute distress.   HENT: external ears normal. Nares patent. Mucous membranes moist.  Eyes: perrla, extraocular muscles intact. No scleral icterus.   Neck: No JVD  Lungs: clear to auscultation  COR: regular rate and rhythm, No murmurs, rubs, or gallops  Abd: nondistended, BS present  Extrem: No edema        Please refer above for cardiac ROS details.       Past History   Past Medical History:   Past Medical History:   Diagnosis Date     Arthritis 2005    in my thumb joints     Hearing problem     2010     Hypertension 2007     Reduced vision 2015    begining cateracts     Sensorineural hearing loss 2010     Tinnitus 2010        Past Surgical History:   Past Surgical History:   Procedure Laterality Date     GYN SURGERY  1985    i still have overies     HC REMOVAL OF TONSILS,<15 Y/O      Description: Tonsillectomy;  Recorded: 05/04/2007;     HYSTERECTOMY  1986     RI VAGINAL HYSTERECTOMY,UTERUS 250 GMS/<      Description: Vaginal Hysterectomy;  Recorded: 05/04/2007;  Annotations: menorrhagia; benign     TONSILLECTOMY  1970     ZZC APPENDECTOMY      Description: Appendectomy;  Recorded: 05/04/2007;        Family History:   Family History   Problem Relation Age of Onset     Hypertension Mother      Hypertension Father      Cancer Father 86        Bladder Cancer     Uterine Cancer Daughter      Breast Cancer Paternal Grandmother 30     Hypertension Brother      Esophageal Cancer Brother 34     Breast Cancer Paternal Aunt      Breast Cancer Paternal Aunt      Breast Cancer Paternal Aunt      Breast Cancer Paternal Aunt      Breast Cancer Paternal Aunt      Ovarian Cancer No family hx of        Social History:   Social History     Socioeconomic History     Marital status:      Spouse name: Not on file     Number of children: Not on file     Years of education: Not on file     Highest education  level: Not on file   Occupational History     Not on file   Tobacco Use     Smoking status: Never     Smokeless tobacco: Never   Vaping Use     Vaping status: Not on file   Substance and Sexual Activity     Alcohol use: Not on file     Drug use: Not on file     Sexual activity: Not on file   Other Topics Concern     Not on file   Social History Narrative     Not on file     Social Determinants of Health     Financial Resource Strain: Not on file   Food Insecurity: Not on file   Transportation Needs: Not on file   Physical Activity: Not on file   Stress: Not on file   Social Connections: Not on file   Intimate Partner Violence: Not on file   Housing Stability: Not on file            Lab Results    Chemistry/lipid CBC Cardiac Enzymes/BNP/TSH/INR   Lab Results   Component Value Date    CHOL 171 09/03/2019    HDL 41 (L) 09/03/2019    TRIG 232 (H) 09/03/2019    BUN 13 09/03/2019     09/03/2019    CO2 24 09/03/2019    No results found for: WBC, HGB, HCT, MCV, PLT No results found for: CKTOTAL, CKMB, TROPONINI, BNP, TSH, INR

## 2023-07-17 ENCOUNTER — MYC MEDICAL ADVICE (OUTPATIENT)
Dept: FAMILY MEDICINE | Facility: CLINIC | Age: 73
End: 2023-07-17
Payer: COMMERCIAL

## 2023-07-20 VITALS — SYSTOLIC BLOOD PRESSURE: 107 MMHG | DIASTOLIC BLOOD PRESSURE: 71 MMHG

## 2023-07-20 NOTE — TELEPHONE ENCOUNTER
Patient most recent reported blood pressure entered into patient chart.     SHARMIN DickN, RN  [facility]

## 2023-08-28 ENCOUNTER — ANCILLARY PROCEDURE (OUTPATIENT)
Dept: MAMMOGRAPHY | Facility: CLINIC | Age: 73
End: 2023-08-28
Attending: FAMILY MEDICINE
Payer: COMMERCIAL

## 2023-08-28 DIAGNOSIS — Z12.31 VISIT FOR SCREENING MAMMOGRAM: ICD-10-CM

## 2023-08-28 PROCEDURE — 77067 SCR MAMMO BI INCL CAD: CPT

## 2023-10-27 ENCOUNTER — APPOINTMENT (OUTPATIENT)
Age: 73
Setting detail: DERMATOLOGY
End: 2023-10-27

## 2023-10-27 ENCOUNTER — APPOINTMENT (RX ONLY)
Age: 73
Setting detail: DERMATOLOGY
End: 2023-10-27

## 2023-10-27 DIAGNOSIS — L57.0 ACTINIC KERATOSIS: ICD-10-CM

## 2023-10-27 DIAGNOSIS — B35.1 TINEA UNGUIUM: ICD-10-CM

## 2023-10-27 DIAGNOSIS — D22 MELANOCYTIC NEVI: ICD-10-CM

## 2023-10-27 DIAGNOSIS — T300 ERYTHEMA [FIRST DEGREE], UNSPECIFIED SITE: ICD-10-CM

## 2023-10-27 DIAGNOSIS — L82.1 OTHER SEBORRHEIC KERATOSIS: ICD-10-CM

## 2023-10-27 DIAGNOSIS — L81.4 OTHER MELANIN HYPERPIGMENTATION: ICD-10-CM

## 2023-10-27 DIAGNOSIS — L40.0 PSORIASIS VULGARIS: ICD-10-CM | Status: INADEQUATELY CONTROLLED

## 2023-10-27 DIAGNOSIS — D18.0 HEMANGIOMA: ICD-10-CM

## 2023-10-27 PROBLEM — D22.5 MELANOCYTIC NEVI OF TRUNK: Status: ACTIVE | Noted: 2023-10-27

## 2023-10-27 PROBLEM — D22.61 MELANOCYTIC NEVI OF RIGHT UPPER LIMB, INCLUDING SHOULDER: Status: ACTIVE | Noted: 2023-10-27

## 2023-10-27 PROBLEM — D22.71 MELANOCYTIC NEVI OF RIGHT LOWER LIMB, INCLUDING HIP: Status: ACTIVE | Noted: 2023-10-27

## 2023-10-27 PROBLEM — D22.62 MELANOCYTIC NEVI OF LEFT UPPER LIMB, INCLUDING SHOULDER: Status: ACTIVE | Noted: 2023-10-27

## 2023-10-27 PROBLEM — D18.01 HEMANGIOMA OF SKIN AND SUBCUTANEOUS TISSUE: Status: ACTIVE | Noted: 2023-10-27

## 2023-10-27 PROBLEM — T30.0 BURN OF UNSPECIFIED BODY REGION, UNSPECIFIED DEGREE: Status: ACTIVE | Noted: 2023-10-27

## 2023-10-27 PROBLEM — D22.72 MELANOCYTIC NEVI OF LEFT LOWER LIMB, INCLUDING HIP: Status: ACTIVE | Noted: 2023-10-27

## 2023-10-27 PROCEDURE — 17003 DESTRUCT PREMALG LES 2-14: CPT

## 2023-10-27 PROCEDURE — ? LIQUID NITROGEN

## 2023-10-27 PROCEDURE — 99214 OFFICE O/P EST MOD 30 MIN: CPT | Mod: 25

## 2023-10-27 PROCEDURE — 17000 DESTRUCT PREMALG LESION: CPT

## 2023-10-27 PROCEDURE — ? PRESCRIPTION

## 2023-10-27 PROCEDURE — ? TREATMENT REGIMEN

## 2023-10-27 PROCEDURE — ? COUNSELING

## 2023-10-27 RX ORDER — MUPIROCIN 20 MG/G
OINTMENT TOPICAL
Qty: 22 | Refills: 2 | Status: ERX | COMMUNITY
Start: 2023-10-27

## 2023-10-27 RX ORDER — ROFLUMILAST 3 MG/G
CREAM TOPICAL QD
Qty: 60 | Refills: 4 | Status: ERX

## 2023-10-27 RX ADMIN — MUPIROCIN: 20 OINTMENT TOPICAL at 00:00

## 2023-10-27 ASSESSMENT — LOCATION DETAILED DESCRIPTION DERM
LOCATION DETAILED: LEFT DISTAL POSTERIOR THIGH
LOCATION DETAILED: LEFT ANTERIOR DISTAL THIGH
LOCATION DETAILED: UPPER STERNUM
LOCATION DETAILED: LEFT PROXIMAL DORSAL FOREARM
LOCATION DETAILED: LEFT GREAT TOENAIL
LOCATION DETAILED: PERIUMBILICAL SKIN
LOCATION DETAILED: RIGHT PROXIMAL DORSAL FOREARM
LOCATION DETAILED: LEFT PROXIMAL PRETIBIAL REGION
LOCATION DETAILED: LEFT DISTAL POSTERIOR UPPER ARM
LOCATION DETAILED: RIGHT MEDIAL SUPERIOR CHEST
LOCATION DETAILED: EPIGASTRIC SKIN
LOCATION DETAILED: INFERIOR THORACIC SPINE
LOCATION DETAILED: RIGHT PROXIMAL POSTERIOR UPPER ARM
LOCATION DETAILED: LEFT INFERIOR FOREHEAD
LOCATION DETAILED: RIGHT PROXIMAL CALF
LOCATION DETAILED: LEFT DISTAL POSTERIOR UPPER ARM
LOCATION DETAILED: RIGHT POSTERIOR SHOULDER
LOCATION DETAILED: LEFT SUPERIOR MEDIAL UPPER BACK
LOCATION DETAILED: RIGHT RIB CAGE
LOCATION DETAILED: RIGHT PROXIMAL PRETIBIAL REGION
LOCATION DETAILED: LEFT MID-UPPER BACK
LOCATION DETAILED: LEFT ANTERIOR DISTAL THIGH
LOCATION DETAILED: RIGHT LATERAL SUPERIOR CHEST
LOCATION DETAILED: SUPERIOR LUMBAR SPINE
LOCATION DETAILED: LEFT DISTAL CALF
LOCATION DETAILED: RIGHT PROXIMAL PRETIBIAL REGION
LOCATION DETAILED: LEFT DISTAL POSTERIOR THIGH
LOCATION DETAILED: LEFT PROXIMAL PRETIBIAL REGION
LOCATION DETAILED: RIGHT PROXIMAL DORSAL FOREARM
LOCATION DETAILED: RIGHT RIB CAGE
LOCATION DETAILED: RIGHT PROXIMAL CALF
LOCATION DETAILED: RIGHT PROXIMAL POSTERIOR UPPER ARM
LOCATION DETAILED: LEFT SUPERIOR MEDIAL UPPER BACK
LOCATION DETAILED: UPPER STERNUM
LOCATION DETAILED: LEFT DISTAL CALF
LOCATION DETAILED: EPIGASTRIC SKIN
LOCATION DETAILED: PERIUMBILICAL SKIN
LOCATION DETAILED: RIGHT ANTERIOR DISTAL THIGH
LOCATION DETAILED: LEFT PROXIMAL POSTERIOR UPPER ARM
LOCATION DETAILED: LEFT PROXIMAL POSTERIOR UPPER ARM
LOCATION DETAILED: LEFT SUPERIOR UPPER BACK
LOCATION DETAILED: LEFT SUPERIOR UPPER BACK
LOCATION DETAILED: LEFT PROXIMAL DORSAL FOREARM
LOCATION DETAILED: RIGHT ANTERIOR DISTAL THIGH
LOCATION DETAILED: LEFT MID-UPPER BACK
LOCATION DETAILED: RIGHT LATERAL SUPERIOR CHEST
LOCATION DETAILED: RIGHT DISTAL POSTERIOR THIGH
LOCATION DETAILED: LEFT INFERIOR FOREHEAD
LOCATION DETAILED: RIGHT DISTAL POSTERIOR THIGH
LOCATION DETAILED: RIGHT POSTERIOR SHOULDER
LOCATION DETAILED: SUPERIOR LUMBAR SPINE
LOCATION DETAILED: INFERIOR THORACIC SPINE
LOCATION DETAILED: RIGHT MEDIAL SUPERIOR CHEST
LOCATION DETAILED: LEFT GREAT TOENAIL

## 2023-10-27 ASSESSMENT — LOCATION SIMPLE DESCRIPTION DERM
LOCATION SIMPLE: LEFT THIGH
LOCATION SIMPLE: LEFT CALF
LOCATION SIMPLE: LEFT UPPER BACK
LOCATION SIMPLE: LEFT FOREARM
LOCATION SIMPLE: UPPER BACK
LOCATION SIMPLE: RIGHT SHOULDER
LOCATION SIMPLE: LEFT FOREARM
LOCATION SIMPLE: LEFT CALF
LOCATION SIMPLE: LEFT POSTERIOR THIGH
LOCATION SIMPLE: LEFT FOREHEAD
LOCATION SIMPLE: ABDOMEN
LOCATION SIMPLE: RIGHT POSTERIOR THIGH
LOCATION SIMPLE: RIGHT POSTERIOR THIGH
LOCATION SIMPLE: LEFT GREAT TOE
LOCATION SIMPLE: CHEST
LOCATION SIMPLE: LEFT POSTERIOR UPPER ARM
LOCATION SIMPLE: RIGHT THIGH
LOCATION SIMPLE: LEFT PRETIBIAL REGION
LOCATION SIMPLE: LEFT POSTERIOR THIGH
LOCATION SIMPLE: LEFT THIGH
LOCATION SIMPLE: RIGHT POSTERIOR UPPER ARM
LOCATION SIMPLE: RIGHT PRETIBIAL REGION
LOCATION SIMPLE: LEFT FOREHEAD
LOCATION SIMPLE: LOWER BACK
LOCATION SIMPLE: CHEST
LOCATION SIMPLE: RIGHT POSTERIOR UPPER ARM
LOCATION SIMPLE: RIGHT CALF
LOCATION SIMPLE: LOWER BACK
LOCATION SIMPLE: UPPER BACK
LOCATION SIMPLE: RIGHT FOREARM
LOCATION SIMPLE: RIGHT CALF
LOCATION SIMPLE: ABDOMEN
LOCATION SIMPLE: LEFT GREAT TOE
LOCATION SIMPLE: LEFT UPPER BACK
LOCATION SIMPLE: LEFT POSTERIOR UPPER ARM
LOCATION SIMPLE: RIGHT THIGH
LOCATION SIMPLE: RIGHT PRETIBIAL REGION
LOCATION SIMPLE: LEFT PRETIBIAL REGION
LOCATION SIMPLE: RIGHT SHOULDER
LOCATION SIMPLE: RIGHT FOREARM

## 2023-10-27 ASSESSMENT — LOCATION ZONE DERM
LOCATION ZONE: TOENAIL
LOCATION ZONE: TRUNK
LOCATION ZONE: LEG
LOCATION ZONE: ARM
LOCATION ZONE: ARM
LOCATION ZONE: TRUNK
LOCATION ZONE: TOENAIL
LOCATION ZONE: FACE
LOCATION ZONE: FACE
LOCATION ZONE: LEG

## 2023-10-27 NOTE — PROCEDURE: MIPS QUALITY
Quality 226: Preventive Care And Screening: Tobacco Use: Screening And Cessation Intervention: Patient screened for tobacco use and is an ex/non-smoker
Detail Level: Detailed
Quality 431: Preventive Care And Screening: Unhealthy Alcohol Use - Screening: Patient not identified as an unhealthy alcohol user when screened for unhealthy alcohol use using a systematic screening method
No Repair - Repaired With Adjacent Surgical Defect Text (Leave Blank If You Do Not Want): After obtaining clear surgical margins the defect was repaired concurrently with another surgical defect which was in close approximation.

## 2023-10-27 NOTE — PROCEDURE: LIQUID NITROGEN
Duration Of Freeze Thaw-Cycle (Seconds): 0
Show Aperture Variable?: Yes
Render Note In Bullet Format When Appropriate: No
Consent: The patient's consent was obtained including but not limited to risks of crusting, scabbing, blistering, scarring, darker or lighter pigmentary change, recurrence, incomplete removal and infection.
Number Of Freeze-Thaw Cycles: 1 freeze-thaw cycle
Detail Level: Detailed
Post-Care Instructions: I reviewed with the patient in detail post-care instructions. Patient is to wear sunprotection, and avoid picking at any of the treated lesions. Pt may apply Vaseline to crusted or scabbing areas.

## 2023-10-27 NOTE — PROCEDURE: LIQUID NITROGEN
Consent: The patient's consent was obtained including but not limited to risks of crusting, scabbing, blistering, scarring, darker or lighter pigmentary change, recurrence, incomplete removal and infection.
Post-Care Instructions: I reviewed with the patient in detail post-care instructions. Patient is to wear sunprotection, and avoid picking at any of the treated lesions. Pt may apply Vaseline to crusted or scabbing areas.
Duration Of Freeze Thaw-Cycle (Seconds): 0
Number Of Freeze-Thaw Cycles: 1 freeze-thaw cycle
Detail Level: Detailed
Show Aperture Variable?: Yes
Render Post-Care Instructions In Note?: no

## 2024-02-17 ENCOUNTER — HEALTH MAINTENANCE LETTER (OUTPATIENT)
Age: 74
End: 2024-02-17

## 2024-04-01 ENCOUNTER — APPOINTMENT (RX ONLY)
Age: 74
Setting detail: DERMATOLOGY
End: 2024-04-01

## 2024-04-01 ENCOUNTER — APPOINTMENT (OUTPATIENT)
Age: 74
Setting detail: DERMATOLOGY
End: 2024-04-01

## 2024-04-01 DIAGNOSIS — D49.2 NEOPLASM OF UNSPECIFIED BEHAVIOR OF BONE, SOFT TISSUE, AND SKIN: ICD-10-CM

## 2024-04-01 DIAGNOSIS — L82.1 OTHER SEBORRHEIC KERATOSIS: ICD-10-CM

## 2024-04-01 DIAGNOSIS — L57.0 ACTINIC KERATOSIS: ICD-10-CM

## 2024-04-01 DIAGNOSIS — D22 MELANOCYTIC NEVI: ICD-10-CM

## 2024-04-01 DIAGNOSIS — L81.4 OTHER MELANIN HYPERPIGMENTATION: ICD-10-CM

## 2024-04-01 DIAGNOSIS — D18.0 HEMANGIOMA: ICD-10-CM

## 2024-04-01 DIAGNOSIS — L98.8 OTHER SPECIFIED DISORDERS OF THE SKIN AND SUBCUTANEOUS TISSUE: ICD-10-CM

## 2024-04-01 PROBLEM — D18.01 HEMANGIOMA OF SKIN AND SUBCUTANEOUS TISSUE: Status: ACTIVE | Noted: 2024-04-01

## 2024-04-01 PROBLEM — D22.5 MELANOCYTIC NEVI OF TRUNK: Status: ACTIVE | Noted: 2024-04-01

## 2024-04-01 PROBLEM — D22.72 MELANOCYTIC NEVI OF LEFT LOWER LIMB, INCLUDING HIP: Status: ACTIVE | Noted: 2024-04-01

## 2024-04-01 PROBLEM — D22.71 MELANOCYTIC NEVI OF RIGHT LOWER LIMB, INCLUDING HIP: Status: ACTIVE | Noted: 2024-04-01

## 2024-04-01 PROCEDURE — ? LIQUID NITROGEN

## 2024-04-01 PROCEDURE — 11102 TANGNTL BX SKIN SINGLE LES: CPT

## 2024-04-01 PROCEDURE — 99213 OFFICE O/P EST LOW 20 MIN: CPT | Mod: 25

## 2024-04-01 PROCEDURE — ? COUNSELING

## 2024-04-01 PROCEDURE — ? BIOPSY BY SHAVE METHOD

## 2024-04-01 PROCEDURE — 17000 DESTRUCT PREMALG LESION: CPT | Mod: 59

## 2024-04-01 ASSESSMENT — LOCATION DETAILED DESCRIPTION DERM
LOCATION DETAILED: RIGHT DISTAL POSTERIOR THIGH
LOCATION DETAILED: INFERIOR THORACIC SPINE
LOCATION DETAILED: RIGHT MEDIAL UPPER BACK
LOCATION DETAILED: INFERIOR THORACIC SPINE
LOCATION DETAILED: RIGHT MEDIAL FOREHEAD
LOCATION DETAILED: LEFT INGUINAL FOLD
LOCATION DETAILED: LEFT DISTAL POSTERIOR THIGH
LOCATION DETAILED: RIGHT MEDIAL UPPER BACK
LOCATION DETAILED: RIGHT DISTAL POSTERIOR THIGH
LOCATION DETAILED: PERIUMBILICAL SKIN
LOCATION DETAILED: LEFT DISTAL POSTERIOR THIGH
LOCATION DETAILED: RIGHT DISTAL RADIAL DORSAL FOREARM
LOCATION DETAILED: LEFT INGUINAL FOLD
LOCATION DETAILED: LEFT CENTRAL EYEBROW
LOCATION DETAILED: RIGHT MEDIAL FOREHEAD
LOCATION DETAILED: PERIUMBILICAL SKIN
LOCATION DETAILED: LEFT CENTRAL EYEBROW
LOCATION DETAILED: RIGHT DISTAL RADIAL DORSAL FOREARM

## 2024-04-01 ASSESSMENT — LOCATION SIMPLE DESCRIPTION DERM
LOCATION SIMPLE: LEFT EYEBROW
LOCATION SIMPLE: ABDOMEN
LOCATION SIMPLE: RIGHT POSTERIOR THIGH
LOCATION SIMPLE: UPPER BACK
LOCATION SIMPLE: LEFT INGUINAL FOLD
LOCATION SIMPLE: LEFT INGUINAL FOLD
LOCATION SIMPLE: RIGHT FOREARM
LOCATION SIMPLE: RIGHT UPPER BACK
LOCATION SIMPLE: RIGHT FOREHEAD
LOCATION SIMPLE: RIGHT UPPER BACK
LOCATION SIMPLE: RIGHT POSTERIOR THIGH
LOCATION SIMPLE: UPPER BACK
LOCATION SIMPLE: ABDOMEN
LOCATION SIMPLE: RIGHT FOREARM
LOCATION SIMPLE: LEFT EYEBROW
LOCATION SIMPLE: LEFT POSTERIOR THIGH
LOCATION SIMPLE: RIGHT FOREHEAD
LOCATION SIMPLE: LEFT POSTERIOR THIGH

## 2024-04-01 ASSESSMENT — LOCATION ZONE DERM
LOCATION ZONE: TRUNK
LOCATION ZONE: LEG
LOCATION ZONE: LEG
LOCATION ZONE: TRUNK
LOCATION ZONE: ARM
LOCATION ZONE: FACE
LOCATION ZONE: FACE
LOCATION ZONE: ARM

## 2024-04-01 NOTE — PROCEDURE: LIQUID NITROGEN
Consent: The patient's consent was obtained including but not limited to risks of crusting, scabbing, blistering, scarring, darker or lighter pigmentary change, recurrence, incomplete removal and infection.
Show Applicator Variable?: Yes
Render Post-Care Instructions In Note?: no
Detail Level: Detailed
Number Of Freeze-Thaw Cycles: 1 freeze-thaw cycle
Duration Of Freeze Thaw-Cycle (Seconds): 0
Post-Care Instructions: I reviewed with the patient in detail post-care instructions. Patient is to wear sunprotection, and avoid picking at any of the treated lesions. Pt may apply Vaseline to crusted or scabbing areas.

## 2024-04-01 NOTE — PROCEDURE: COUNSELING
Detail Level: Zone
Detail Level: Detailed
Patient Specific Counseling (Will Not Stick From Patient To Patient): Referred to Celina Ferrari MD for 0.4 fistula

## 2024-04-01 NOTE — PROCEDURE: BIOPSY BY SHAVE METHOD
Cryotherapy Text: The wound bed was treated with cryotherapy after the biopsy was performed.
Validate Anticipated Plan: No
X Size Of Lesion In Cm: 0
Notification Instructions: Patient will be notified of biopsy results. However, patient instructed to call the office if not contacted within 2 weeks.
Anesthesia Type: 0.5% lidocaine with 1:100,000 epinephrine and a 1:10 solution of 8.4% sodium bicarbonate
Detail Level: Detailed
Consent: Written consent was obtained and risks were reviewed including but not limited to scarring, infection, bleeding, scabbing, incomplete removal, nerve damage and allergy to anesthesia.
Electrodesiccation Text: The wound bed was treated with electrodesiccation after the biopsy was performed.
Hemostasis: Aluminum Chloride
Information: Selecting Yes will display possible errors in your note based on the variables you have selected. This validation is only offered as a suggestion for you. PLEASE NOTE THAT THE VALIDATION TEXT WILL BE REMOVED WHEN YOU FINALIZE YOUR NOTE. IF YOU WANT TO FAX A PRELIMINARY NOTE YOU WILL NEED TO TOGGLE THIS TO 'NO' IF YOU DO NOT WANT IT IN YOUR FAXED NOTE.
Post-Care Instructions: I reviewed with the patient in detail post-care instructions. Patient is to keep the biopsy site dry overnight, and then apply bacitracin twice daily until healed. Patient may apply hydrogen peroxide soaks to remove any crusting.
Was A Bandage Applied: Yes
Biopsy Method: Personna blade
Size Of Lesion In Cm: 0.4
Silver Nitrate Text: The wound bed was treated with silver nitrate after the biopsy was performed.
Anesthesia Volume In Cc: 0.5
Depth Of Biopsy: dermis
Electrodesiccation And Curettage Text: The wound bed was treated with electrodesiccation and curettage after the biopsy was performed.
Biopsy Type: H and E
Type Of Destruction Used: Curettage
Curettage Text: The wound bed was treated with curettage after the biopsy was performed.
Wound Care: Petrolatum
Billing Type: Third-Party Bill
Dressing: bandage

## 2024-04-01 NOTE — PROCEDURE: BIOPSY BY SHAVE METHOD
Detail Level: Detailed
Depth Of Biopsy: dermis
Was A Bandage Applied: Yes
Size Of Lesion In Cm: 0.4
X Size Of Lesion In Cm: 0
Biopsy Type: H and E
Biopsy Method: Personna blade
Anesthesia Type: 0.5% lidocaine with 1:100,000 epinephrine and a 1:10 solution of 8.4% sodium bicarbonate
Anesthesia Volume In Cc: 0.5
Hemostasis: Aluminum Chloride
Wound Care: Petrolatum
Dressing: bandage
Destruction After The Procedure: No
Type Of Destruction Used: Curettage
Curettage Text: The wound bed was treated with curettage after the biopsy was performed.
Cryotherapy Text: The wound bed was treated with cryotherapy after the biopsy was performed.
Electrodesiccation Text: The wound bed was treated with electrodesiccation after the biopsy was performed.
Electrodesiccation And Curettage Text: The wound bed was treated with electrodesiccation and curettage after the biopsy was performed.
Silver Nitrate Text: The wound bed was treated with silver nitrate after the biopsy was performed.
Lab: -[id number]
Consent: Written consent was obtained and risks were reviewed including but not limited to scarring, infection, bleeding, scabbing, incomplete removal, nerve damage and allergy to anesthesia.
Post-Care Instructions: I reviewed with the patient in detail post-care instructions. Patient is to keep the biopsy site dry overnight, and then apply bacitracin twice daily until healed. Patient may apply hydrogen peroxide soaks to remove any crusting.
Notification Instructions: Patient will be notified of biopsy results. However, patient instructed to call the office if not contacted within 2 weeks.
Billing Type: Third-Party Bill
Information: Selecting Yes will display possible errors in your note based on the variables you have selected. This validation is only offered as a suggestion for you. PLEASE NOTE THAT THE VALIDATION TEXT WILL BE REMOVED WHEN YOU FINALIZE YOUR NOTE. IF YOU WANT TO FAX A PRELIMINARY NOTE YOU WILL NEED TO TOGGLE THIS TO 'NO' IF YOU DO NOT WANT IT IN YOUR FAXED NOTE.

## 2024-04-01 NOTE — PROCEDURE: COUNSELING
Detail Level: Zone
Detail Level: Detailed
Patient Specific Counseling (Will Not Stick From Patient To Patient): Referred to Roxanne Talamantes MD for 0.4 fistula

## 2024-05-01 ENCOUNTER — APPOINTMENT (OUTPATIENT)
Age: 74
Setting detail: DERMATOLOGY
End: 2024-05-01

## 2024-05-01 ENCOUNTER — APPOINTMENT (RX ONLY)
Age: 74
Setting detail: DERMATOLOGY
End: 2024-05-01

## 2024-05-01 DIAGNOSIS — L98.8 OTHER SPECIFIED DISORDERS OF THE SKIN AND SUBCUTANEOUS TISSUE: ICD-10-CM | Status: IMPROVED

## 2024-05-01 DIAGNOSIS — L57.0 ACTINIC KERATOSIS: ICD-10-CM

## 2024-05-01 DIAGNOSIS — L21.8 OTHER SEBORRHEIC DERMATITIS: ICD-10-CM | Status: INADEQUATELY CONTROLLED

## 2024-05-01 PROCEDURE — 99214 OFFICE O/P EST MOD 30 MIN: CPT | Mod: 25

## 2024-05-01 PROCEDURE — 17000 DESTRUCT PREMALG LESION: CPT

## 2024-05-01 PROCEDURE — ? COUNSELING

## 2024-05-01 PROCEDURE — ? PRESCRIPTION

## 2024-05-01 PROCEDURE — ? OTHER

## 2024-05-01 PROCEDURE — ? LIQUID NITROGEN

## 2024-05-01 PROCEDURE — ? VISIT COMPLEXITY

## 2024-05-01 RX ORDER — KETOCONAZOLE 20 MG/G
CREAM TOPICAL BID
Qty: 30 | Refills: 6 | Status: ERX | COMMUNITY
Start: 2024-05-01

## 2024-05-01 RX ADMIN — KETOCONAZOLE: 20 CREAM TOPICAL at 00:00

## 2024-05-01 ASSESSMENT — LOCATION DETAILED DESCRIPTION DERM
LOCATION DETAILED: RIGHT CENTRAL EYEBROW
LOCATION DETAILED: LEFT MEDIAL EYEBROW
LOCATION DETAILED: LEFT INGUINAL FOLD
LOCATION DETAILED: LEFT INFERIOR MEDIAL FOREHEAD
LOCATION DETAILED: GLABELLA
LOCATION DETAILED: GLABELLA
LOCATION DETAILED: LEFT INFERIOR MEDIAL FOREHEAD
LOCATION DETAILED: LEFT MEDIAL EYEBROW
LOCATION DETAILED: LEFT INGUINAL FOLD
LOCATION DETAILED: LEFT CENTRAL EYEBROW
LOCATION DETAILED: LEFT CENTRAL EYEBROW
LOCATION DETAILED: RIGHT CENTRAL EYEBROW

## 2024-05-01 ASSESSMENT — LOCATION SIMPLE DESCRIPTION DERM
LOCATION SIMPLE: GLABELLA
LOCATION SIMPLE: RIGHT EYEBROW
LOCATION SIMPLE: LEFT FOREHEAD
LOCATION SIMPLE: LEFT INGUINAL FOLD
LOCATION SIMPLE: RIGHT EYEBROW
LOCATION SIMPLE: LEFT EYEBROW
LOCATION SIMPLE: GLABELLA
LOCATION SIMPLE: LEFT INGUINAL FOLD
LOCATION SIMPLE: LEFT FOREHEAD
LOCATION SIMPLE: LEFT EYEBROW

## 2024-05-01 ASSESSMENT — LOCATION ZONE DERM
LOCATION ZONE: LEG
LOCATION ZONE: LEG
LOCATION ZONE: FACE
LOCATION ZONE: FACE

## 2024-05-01 NOTE — PROCEDURE: OTHER
Detail Level: Zone
Render Risk Assessment In Note?: no
Note Text (......Xxx Chief Complaint.): This diagnosis correlates with the
Other (Free Text): * patient went to Celina Ferrari’s office in [place] \\nWas seen by a NP , was given clindamycin and otc panoxyl wash \\nPatient states the area has improved since the last visit .
Self

## 2024-05-01 NOTE — PROCEDURE: MIPS QUALITY
Quality 47: Advance Care Plan: Advance Care Planning discussed and documented; advance care plan or surrogate decision maker documented in the medical record.
Quality 226: Preventive Care And Screening: Tobacco Use: Screening And Cessation Intervention: Patient screened for tobacco use and is an ex/non-smoker
Quality 431: Preventive Care And Screening: Unhealthy Alcohol Use - Screening: Patient not identified as an unhealthy alcohol user when screened for unhealthy alcohol use using a systematic screening method
Detail Level: Detailed
Name And Contact Information For Health Care Proxy:  - info emergency contact

## 2024-05-01 NOTE — PROCEDURE: OTHER
Other (Free Text): * patient went to Roxanne Talamantes’s office in [place] \\nWas seen by a NP , was given clindamycin and otc panoxyl wash \\nPatient states the area has improved since the last visit .
Note Text (......Xxx Chief Complaint.): This diagnosis correlates with the
Render Risk Assessment In Note?: no
Detail Level: Zone

## 2024-05-01 NOTE — PROCEDURE: MIPS QUALITY
Detail Level: Detailed
Name And Contact Information For Health Care Proxy:  - info emergency contact
Quality 431: Preventive Care And Screening: Unhealthy Alcohol Use - Screening: Patient not identified as an unhealthy alcohol user when screened for unhealthy alcohol use using a systematic screening method
Quality 226: Preventive Care And Screening: Tobacco Use: Screening And Cessation Intervention: Patient screened for tobacco use and is an ex/non-smoker
Quality 47: Advance Care Plan: Advance Care Planning discussed and documented; advance care plan or surrogate decision maker documented in the medical record.

## 2024-09-04 ENCOUNTER — ANCILLARY PROCEDURE (OUTPATIENT)
Dept: MAMMOGRAPHY | Facility: CLINIC | Age: 74
End: 2024-09-04
Attending: FAMILY MEDICINE
Payer: COMMERCIAL

## 2024-09-04 DIAGNOSIS — Z12.31 VISIT FOR SCREENING MAMMOGRAM: ICD-10-CM

## 2024-09-04 PROCEDURE — 77063 BREAST TOMOSYNTHESIS BI: CPT

## 2024-10-01 ENCOUNTER — APPOINTMENT (RX ONLY)
Age: 74
Setting detail: DERMATOLOGY
End: 2024-10-01

## 2024-10-01 ENCOUNTER — APPOINTMENT (OUTPATIENT)
Age: 74
Setting detail: DERMATOLOGY
End: 2024-10-01

## 2024-10-01 DIAGNOSIS — Z41.9 ENCOUNTER FOR PROCEDURE FOR PURPOSES OTHER THAN REMEDYING HEALTH STATE, UNSPECIFIED: ICD-10-CM

## 2024-10-01 DIAGNOSIS — D22 MELANOCYTIC NEVI: ICD-10-CM

## 2024-10-01 DIAGNOSIS — L40.4 GUTTATE PSORIASIS: ICD-10-CM

## 2024-10-01 DIAGNOSIS — D18.0 HEMANGIOMA: ICD-10-CM

## 2024-10-01 DIAGNOSIS — L81.4 OTHER MELANIN HYPERPIGMENTATION: ICD-10-CM

## 2024-10-01 DIAGNOSIS — L82.1 OTHER SEBORRHEIC KERATOSIS: ICD-10-CM

## 2024-10-01 DIAGNOSIS — L57.0 ACTINIC KERATOSIS: ICD-10-CM

## 2024-10-01 PROBLEM — D22.72 MELANOCYTIC NEVI OF LEFT LOWER LIMB, INCLUDING HIP: Status: ACTIVE | Noted: 2024-10-01

## 2024-10-01 PROBLEM — D22.71 MELANOCYTIC NEVI OF RIGHT LOWER LIMB, INCLUDING HIP: Status: ACTIVE | Noted: 2024-10-01

## 2024-10-01 PROBLEM — D18.01 HEMANGIOMA OF SKIN AND SUBCUTANEOUS TISSUE: Status: ACTIVE | Noted: 2024-10-01

## 2024-10-01 PROBLEM — D22.5 MELANOCYTIC NEVI OF TRUNK: Status: ACTIVE | Noted: 2024-10-01

## 2024-10-01 PROCEDURE — ? COUNSELING

## 2024-10-01 PROCEDURE — 99213 OFFICE O/P EST LOW 20 MIN: CPT | Mod: 25

## 2024-10-01 PROCEDURE — 17000 DESTRUCT PREMALG LESION: CPT

## 2024-10-01 PROCEDURE — ? TREATMENT REGIMEN

## 2024-10-01 PROCEDURE — ? LIQUID NITROGEN

## 2024-10-01 ASSESSMENT — LOCATION DETAILED DESCRIPTION DERM
LOCATION DETAILED: LEFT CENTRAL EYEBROW
LOCATION DETAILED: RIGHT DISTAL PRETIBIAL REGION
LOCATION DETAILED: INFERIOR THORACIC SPINE
LOCATION DETAILED: RIGHT INFERIOR CENTRAL BUCCAL CHEEK
LOCATION DETAILED: RIGHT DISTAL POSTERIOR THIGH
LOCATION DETAILED: RIGHT MEDIAL UPPER BACK
LOCATION DETAILED: PERIUMBILICAL SKIN
LOCATION DETAILED: LEFT INFERIOR LATERAL BUCCAL CHEEK
LOCATION DETAILED: RIGHT MEDIAL UPPER BACK
LOCATION DETAILED: RIGHT MEDIAL FOREHEAD
LOCATION DETAILED: RIGHT MEDIAL FOREHEAD
LOCATION DETAILED: RIGHT CHIN
LOCATION DETAILED: LEFT DISTAL POSTERIOR THIGH
LOCATION DETAILED: LEFT INFERIOR LATERAL BUCCAL CHEEK
LOCATION DETAILED: RIGHT DISTAL POSTERIOR THIGH
LOCATION DETAILED: INFERIOR THORACIC SPINE
LOCATION DETAILED: LEFT DISTAL POSTERIOR THIGH
LOCATION DETAILED: RIGHT INFERIOR CENTRAL BUCCAL CHEEK
LOCATION DETAILED: LEFT CENTRAL EYEBROW
LOCATION DETAILED: PERIUMBILICAL SKIN
LOCATION DETAILED: RIGHT CHIN
LOCATION DETAILED: RIGHT DISTAL PRETIBIAL REGION

## 2024-10-01 ASSESSMENT — LOCATION SIMPLE DESCRIPTION DERM
LOCATION SIMPLE: RIGHT PRETIBIAL REGION
LOCATION SIMPLE: LEFT POSTERIOR THIGH
LOCATION SIMPLE: LEFT CHEEK
LOCATION SIMPLE: LEFT EYEBROW
LOCATION SIMPLE: LEFT EYEBROW
LOCATION SIMPLE: RIGHT FOREHEAD
LOCATION SIMPLE: RIGHT UPPER BACK
LOCATION SIMPLE: UPPER BACK
LOCATION SIMPLE: ABDOMEN
LOCATION SIMPLE: RIGHT FOREHEAD
LOCATION SIMPLE: LEFT POSTERIOR THIGH
LOCATION SIMPLE: RIGHT PRETIBIAL REGION
LOCATION SIMPLE: UPPER BACK
LOCATION SIMPLE: CHIN
LOCATION SIMPLE: RIGHT CHEEK
LOCATION SIMPLE: RIGHT POSTERIOR THIGH
LOCATION SIMPLE: CHIN
LOCATION SIMPLE: RIGHT POSTERIOR THIGH
LOCATION SIMPLE: LEFT CHEEK
LOCATION SIMPLE: ABDOMEN
LOCATION SIMPLE: RIGHT CHEEK
LOCATION SIMPLE: RIGHT UPPER BACK

## 2024-10-01 ASSESSMENT — LOCATION ZONE DERM
LOCATION ZONE: LEG
LOCATION ZONE: TRUNK
LOCATION ZONE: LEG
LOCATION ZONE: FACE
LOCATION ZONE: TRUNK
LOCATION ZONE: FACE

## 2024-10-01 NOTE — PROCEDURE: TREATMENT REGIMEN
Otc Regimen: Recommended musely hair removal cream
Detail Level: Zone
Action 4: Continue
Continue Regimen: Zoryve cream as needed

## 2024-10-01 NOTE — PROCEDURE: LIQUID NITROGEN
Post-Care Instructions: I reviewed with the patient in detail post-care instructions. Patient is to wear sunprotection, and avoid picking at any of the treated lesions. Pt may apply Vaseline to crusted or scabbing areas.
Detail Level: Detailed
Show Aperture Variable?: Yes
Consent: The patient's consent was obtained including but not limited to risks of crusting, scabbing, blistering, scarring, darker or lighter pigmentary change, recurrence, incomplete removal and infection.
Render Post-Care Instructions In Note?: no
Duration Of Freeze Thaw-Cycle (Seconds): 0
Number Of Freeze-Thaw Cycles: 1 freeze-thaw cycle

## 2025-03-08 ENCOUNTER — HEALTH MAINTENANCE LETTER (OUTPATIENT)
Age: 75
End: 2025-03-08

## 2025-04-22 ENCOUNTER — APPOINTMENT (OUTPATIENT)
Age: 75
Setting detail: DERMATOLOGY
End: 2025-04-22

## 2025-04-22 DIAGNOSIS — L82.1 OTHER SEBORRHEIC KERATOSIS: ICD-10-CM

## 2025-04-22 DIAGNOSIS — D485 NEOPLASM OF UNCERTAIN BEHAVIOR OF SKIN: ICD-10-CM

## 2025-04-22 DIAGNOSIS — D18.0 HEMANGIOMA: ICD-10-CM

## 2025-04-22 DIAGNOSIS — D22 MELANOCYTIC NEVI: ICD-10-CM

## 2025-04-22 DIAGNOSIS — L70.0 ACNE VULGARIS: ICD-10-CM | Status: INADEQUATELY CONTROLLED

## 2025-04-22 DIAGNOSIS — L81.4 OTHER MELANIN HYPERPIGMENTATION: ICD-10-CM

## 2025-04-22 PROBLEM — D18.01 HEMANGIOMA OF SKIN AND SUBCUTANEOUS TISSUE: Status: ACTIVE | Noted: 2025-04-22

## 2025-04-22 PROBLEM — D22.9 MELANOCYTIC NEVI, UNSPECIFIED: Status: ACTIVE | Noted: 2025-04-22

## 2025-04-22 PROBLEM — D48.5 NEOPLASM OF UNCERTAIN BEHAVIOR OF SKIN: Status: ACTIVE | Noted: 2025-04-22

## 2025-04-22 PROCEDURE — ? PRESCRIPTION

## 2025-04-22 PROCEDURE — ? BIOPSY BY SHAVE METHOD

## 2025-04-22 PROCEDURE — ? COUNSELING

## 2025-04-22 PROCEDURE — 99214 OFFICE O/P EST MOD 30 MIN: CPT | Mod: 25

## 2025-04-22 PROCEDURE — 11102 TANGNTL BX SKIN SINGLE LES: CPT

## 2025-04-22 RX ORDER — CLINDAMYCIN PHOSPHATE 10 MG/ML
SOLUTION TOPICAL QAM
Qty: 60 | Refills: 6 | Status: ERX | COMMUNITY
Start: 2025-04-22

## 2025-04-22 RX ADMIN — CLINDAMYCIN PHOSPHATE: 10 SOLUTION TOPICAL at 00:00

## 2025-04-22 ASSESSMENT — LOCATION SIMPLE DESCRIPTION DERM
LOCATION SIMPLE: ABDOMEN
LOCATION SIMPLE: LEFT UPPER BACK
LOCATION SIMPLE: LEFT EYEBROW
LOCATION SIMPLE: LEFT SHOULDER
LOCATION SIMPLE: RIGHT PRETIBIAL REGION
LOCATION SIMPLE: RIGHT SHOULDER
LOCATION SIMPLE: RIGHT CHEEK
LOCATION SIMPLE: LEFT CHEEK

## 2025-04-22 ASSESSMENT — LOCATION DETAILED DESCRIPTION DERM
LOCATION DETAILED: RIGHT CENTRAL BUCCAL CHEEK
LOCATION DETAILED: RIGHT POSTERIOR SHOULDER
LOCATION DETAILED: LEFT MEDIAL EYEBROW
LOCATION DETAILED: RIGHT PROXIMAL PRETIBIAL REGION
LOCATION DETAILED: LEFT ANTERIOR SHOULDER
LOCATION DETAILED: LEFT SUPERIOR UPPER BACK
LOCATION DETAILED: EPIGASTRIC SKIN
LOCATION DETAILED: LEFT CENTRAL MALAR CHEEK

## 2025-04-22 ASSESSMENT — LOCATION ZONE DERM
LOCATION ZONE: FACE
LOCATION ZONE: ARM
LOCATION ZONE: TRUNK
LOCATION ZONE: LEG

## 2025-04-22 NOTE — PROCEDURE: BIOPSY BY SHAVE METHOD
Detail Level: Detailed
Depth Of Biopsy: dermis
Was A Bandage Applied: Yes
Size Of Lesion In Cm: 0.4
X Size Of Lesion In Cm: 0
Biopsy Type: H and E
Biopsy Method: Dermablade
Anesthesia Type: 0.5% lidocaine with 1:200,000 epinephrine and a 1:10 solution of 8.4% sodium bicarbonate
Anesthesia Volume In Cc: 1.5
Hemostasis: Aluminum Chloride
Wound Care: Petrolatum
Dressing: pressure dressing
Destruction After The Procedure: No
Type Of Destruction Used: Curettage
Curettage Text: The wound bed was treated with curettage after the biopsy was performed.
Cryotherapy Text: The wound bed was treated with cryotherapy after the biopsy was performed.
Electrodesiccation Text: The wound bed was treated with electrodesiccation after the biopsy was performed.
Electrodesiccation And Curettage Text: The wound bed was treated with electrodesiccation and curettage after the biopsy was performed.
Silver Nitrate Text: The wound bed was treated with silver nitrate after the biopsy was performed.
Lab: -[id number]
Medical Necessity Information: It is in your best interest to select a reason for this procedure from the list below. All of these items fulfill various CMS LCD requirements except the new and changing color options.
Consent: Written consent was obtained and risks were reviewed including but not limited to scarring, infection, bleeding, scabbing, incomplete removal, nerve damage and allergy to anesthesia.
Post-Care Instructions: I reviewed with the patient in detail post-care instructions. Patient is to keep the biopsy site dry overnight, and then apply bacitracin twice daily until healed. Patient may apply hydrogen peroxide soaks to remove any crusting.
Notification Instructions: Patient will be notified of biopsy results. However, patient instructed to call the office if not contacted within 2 weeks.
Billing Type: Third-Party Bill
Information: Selecting Yes will display possible errors in your note based on the variables you have selected. This validation is only offered as a suggestion for you. PLEASE NOTE THAT THE VALIDATION TEXT WILL BE REMOVED WHEN YOU FINALIZE YOUR NOTE. IF YOU WANT TO FAX A PRELIMINARY NOTE YOU WILL NEED TO TOGGLE THIS TO 'NO' IF YOU DO NOT WANT IT IN YOUR FAXED NOTE.

## 2025-04-22 NOTE — PROCEDURE: COUNSELING
Dapsone Pregnancy And Lactation Text: This medication is Pregnancy Category C and is not considered safe during pregnancy or breast feeding.
Minocycline Counseling: Patient advised regarding possible photosensitivity and discoloration of the teeth, skin, lips, tongue and gums.  Patient instructed to avoid sunlight, if possible.  When exposed to sunlight, patients should wear protective clothing, sunglasses, and sunscreen.  The patient was instructed to call the office immediately if the following severe adverse effects occur:  hearing changes, easy bruising/bleeding, severe headache, or vision changes.  The patient verbalized understanding of the proper use and possible adverse effects of minocycline.  All of the patient's questions and concerns were addressed.
Topical Sulfur Applications Pregnancy And Lactation Text: This medication is Pregnancy Category C and has an unknown safety profile during pregnancy. It is unknown if this topical medication is excreted in breast milk.
Tetracycline Pregnancy And Lactation Text: This medication is Pregnancy Category D and not consider safe during pregnancy. It is also excreted in breast milk.
Doxycycline Pregnancy And Lactation Text: This medication is Pregnancy Category D and not consider safe during pregnancy. It is also excreted in breast milk but is considered safe for shorter treatment courses.
Topical Clindamycin Pregnancy And Lactation Text: This medication is Pregnancy Category B and is considered safe during pregnancy. It is unknown if it is excreted in breast milk.
Benzoyl Peroxide Counseling: Patient counseled that medicine may cause skin irritation and bleach clothing.  In the event of skin irritation, the patient was advised to reduce the amount of the drug applied or use it less frequently.   The patient verbalized understanding of the proper use and possible adverse effects of benzoyl peroxide.  All of the patient's questions and concerns were addressed.
Bactrim Counseling:  I discussed with the patient the risks of sulfa antibiotics including but not limited to GI upset, allergic reaction, drug rash, diarrhea, dizziness, photosensitivity, and yeast infections.  Rarely, more serious reactions can occur including but not limited to aplastic anemia, agranulocytosis, methemoglobinemia, blood dyscrasias, liver or kidney failure, lung infiltrates or desquamative/blistering drug rashes.
Sarecycline Counseling: Patient advised regarding possible photosensitivity and discoloration of the teeth, skin, lips, tongue and gums.  Patient instructed to avoid sunlight, if possible.  When exposed to sunlight, patients should wear protective clothing, sunglasses, and sunscreen.  The patient was instructed to call the office immediately if the following severe adverse effects occur:  hearing changes, easy bruising/bleeding, severe headache, or vision changes.  The patient verbalized understanding of the proper use and possible adverse effects of sarecycline.  All of the patient's questions and concerns were addressed.
Birth Control Pills Counseling: Birth Control Pill Counseling: I discussed with the patient the potential side effects of OCPs including but not limited to increased risk of stroke, heart attack, thrombophlebitis, deep venous thrombosis, hepatic adenomas, breast changes, GI upset, headaches, and depression.  The patient verbalized understanding of the proper use and possible adverse effects of OCPs. All of the patient's questions and concerns were addressed.
Erythromycin Pregnancy And Lactation Text: This medication is Pregnancy Category B and is considered safe during pregnancy. It is also excreted in breast milk.
Isotretinoin Counseling: Patient should get monthly blood tests, not donate blood, not drive at night if vision affected, not share medication, and not undergo elective surgery for 6 months after tx completed. Side effects reviewed, pt to contact office should one occur.
Use Enhanced Medication Counseling?: No
Birth Control Pills Pregnancy And Lactation Text: This medication should be avoided if pregnant and for the first 30 days post-partum.
High Dose Vitamin A Counseling: Side effects reviewed, pt to contact office should one occur.
Tazorac Counseling:  Patient advised that medication is irritating and drying.  Patient may need to apply sparingly and wash off after an hour before eventually leaving it on overnight.  The patient verbalized understanding of the proper use and possible adverse effects of tazorac.  All of the patient's questions and concerns were addressed.
Winlevi Pregnancy And Lactation Text: This medication is considered safe during pregnancy and breastfeeding.
Spironolactone Pregnancy And Lactation Text: This medication can cause feminization of the male fetus and should be avoided during pregnancy. The active metabolite is also found in breast milk.
Aklief Pregnancy And Lactation Text: It is unknown if this medication is safe to use during pregnancy.  It is unknown if this medication is excreted in breast milk.  Breastfeeding women should use the topical cream on the smallest area of the skin for the shortest time needed while breastfeeding.  Do not apply to nipple and areola.
Topical Retinoid counseling:  Patient advised to apply a pea-sized amount only at bedtime and wait 30 minutes after washing their face before applying.  If too drying, patient may add a non-comedogenic moisturizer. The patient verbalized understanding of the proper use and possible adverse effects of retinoids.  All of the patient's questions and concerns were addressed.
Azithromycin Counseling:  I discussed with the patient the risks of azithromycin including but not limited to GI upset, allergic reaction, drug rash, diarrhea, and yeast infections.
Topical Sulfur Applications Counseling: Topical Sulfur Counseling: Patient counseled that this medication may cause skin irritation or allergic reactions.  In the event of skin irritation, the patient was advised to reduce the amount of the drug applied or use it less frequently.   The patient verbalized understanding of the proper use and possible adverse effects of topical sulfur application.  All of the patient's questions and concerns were addressed.
Benzoyl Peroxide Pregnancy And Lactation Text: This medication is Pregnancy Category C. It is unknown if benzoyl peroxide is excreted in breast milk.
Azithromycin Pregnancy And Lactation Text: This medication is considered safe during pregnancy and is also secreted in breast milk.
Doxycycline Counseling:  Patient counseled regarding possible photosensitivity and increased risk for sunburn.  Patient instructed to avoid sunlight, if possible.  When exposed to sunlight, patients should wear protective clothing, sunglasses, and sunscreen.  The patient was instructed to call the office immediately if the following severe adverse effects occur:  hearing changes, easy bruising/bleeding, severe headache, or vision changes.  The patient verbalized understanding of the proper use and possible adverse effects of doxycycline.  All of the patient's questions and concerns were addressed.
Erythromycin Counseling:  I discussed with the patient the risks of erythromycin including but not limited to GI upset, allergic reaction, drug rash, diarrhea, increase in liver enzymes, and yeast infections.
Bactrim Pregnancy And Lactation Text: This medication is Pregnancy Category D and is known to cause fetal risk.  It is also excreted in breast milk.
Topical Clindamycin Counseling: Patient counseled that this medication may cause skin irritation or allergic reactions.  In the event of skin irritation, the patient was advised to reduce the amount of the drug applied or use it less frequently.   The patient verbalized understanding of the proper use and possible adverse effects of clindamycin.  All of the patient's questions and concerns were addressed.
Azelaic Acid Pregnancy And Lactation Text: This medication is considered safe during pregnancy and breast feeding.
Detail Level: Detailed
Tazorac Pregnancy And Lactation Text: This medication is not safe during pregnancy. It is unknown if this medication is excreted in breast milk.
Spironolactone Counseling: Patient advised regarding risks of diarrhea, abdominal pain, hyperkalemia, birth defects (for female patients), liver toxicity and renal toxicity. The patient may need blood work to monitor liver and kidney function and potassium levels while on therapy. The patient verbalized understanding of the proper use and possible adverse effects of spironolactone.  All of the patient's questions and concerns were addressed.
Azelaic Acid Counseling: Patient counseled that medicine may cause skin irritation and to avoid applying near the eyes.  In the event of skin irritation, the patient was advised to reduce the amount of the drug applied or use it less frequently.   The patient verbalized understanding of the proper use and possible adverse effects of azelaic acid.  All of the patient's questions and concerns were addressed.
Aklief counseling:  Patient advised to apply a pea-sized amount only at bedtime and wait 30 minutes after washing their face before applying.  If too drying, patient may add a non-comedogenic moisturizer.  The most commonly reported side effects including irritation, redness, scaling, dryness, stinging, burning, itching, and increased risk of sunburn.  The patient verbalized understanding of the proper use and possible adverse effects of retinoids.  All of the patient's questions and concerns were addressed.
Isotretinoin Pregnancy And Lactation Text: This medication is Pregnancy Category X and is considered extremely dangerous during pregnancy. It is unknown if it is excreted in breast milk.
Dapsone Counseling: I discussed with the patient the risks of dapsone including but not limited to hemolytic anemia, agranulocytosis, rashes, methemoglobinemia, kidney failure, peripheral neuropathy, headaches, GI upset, and liver toxicity.  Patients who start dapsone require monitoring including baseline LFTs and weekly CBCs for the first month, then every month thereafter.  The patient verbalized understanding of the proper use and possible adverse effects of dapsone.  All of the patient's questions and concerns were addressed.
High Dose Vitamin A Pregnancy And Lactation Text: High dose vitamin A therapy is contraindicated during pregnancy and breast feeding.
Topical Retinoid Pregnancy And Lactation Text: This medication is Pregnancy Category C. It is unknown if this medication is excreted in breast milk.
Winlevi Counseling:  I discussed with the patient the risks of topical clascoterone including but not limited to erythema, scaling, itching, and stinging. Patient voiced their understanding.
Tetracycline Counseling: Patient counseled regarding possible photosensitivity and increased risk for sunburn.  Patient instructed to avoid sunlight, if possible.  When exposed to sunlight, patients should wear protective clothing, sunglasses, and sunscreen.  The patient was instructed to call the office immediately if the following severe adverse effects occur:  hearing changes, easy bruising/bleeding, severe headache, or vision changes.  The patient verbalized understanding of the proper use and possible adverse effects of tetracycline.  All of the patient's questions and concerns were addressed. Patient understands to avoid pregnancy while on therapy due to potential birth defects.
Detail Level: Generalized

## 2025-08-21 ENCOUNTER — TRANSFERRED RECORDS (OUTPATIENT)
Dept: HEALTH INFORMATION MANAGEMENT | Facility: CLINIC | Age: 75
End: 2025-08-21
Payer: COMMERCIAL